# Patient Record
Sex: FEMALE | Race: WHITE | NOT HISPANIC OR LATINO | URBAN - NONMETROPOLITAN AREA
[De-identification: names, ages, dates, MRNs, and addresses within clinical notes are randomized per-mention and may not be internally consistent; named-entity substitution may affect disease eponyms.]

---

## 2024-05-14 ENCOUNTER — APPOINTMENT (RX ONLY)
Dept: URBAN - NONMETROPOLITAN AREA CLINIC 1 | Facility: CLINIC | Age: 34
Setting detail: DERMATOLOGY
End: 2024-05-14

## 2024-05-14 DIAGNOSIS — D22 MELANOCYTIC NEVI: ICD-10-CM

## 2024-05-14 DIAGNOSIS — L91.8 OTHER HYPERTROPHIC DISORDERS OF THE SKIN: ICD-10-CM

## 2024-05-14 PROBLEM — D22.5 MELANOCYTIC NEVI OF TRUNK: Status: ACTIVE | Noted: 2024-05-14

## 2024-05-14 PROCEDURE — ? OBSERVATION

## 2024-05-14 PROCEDURE — ? COUNSELING

## 2024-05-14 PROCEDURE — 99202 OFFICE O/P NEW SF 15 MIN: CPT

## 2024-05-14 PROCEDURE — ? SKIN TAG REMOVAL (COSMETIC)

## 2024-05-14 ASSESSMENT — LOCATION DETAILED DESCRIPTION DERM
LOCATION DETAILED: PERIUMBILICAL SKIN
LOCATION DETAILED: RIGHT CLAVICULAR NECK
LOCATION DETAILED: LEFT ANTERIOR MEDIAL PROXIMAL THIGH
LOCATION DETAILED: RIGHT INFERIOR LATERAL NECK
LOCATION DETAILED: RIGHT INFRAMAMMARY CREASE (INNER QUADRANT)
LOCATION DETAILED: LEFT AXILLARY VAULT
LOCATION DETAILED: RIGHT LATERAL UPPER BACK
LOCATION DETAILED: LEFT SUPERIOR ANTERIOR NECK
LOCATION DETAILED: EPIGASTRIC SKIN

## 2024-05-14 ASSESSMENT — LOCATION ZONE DERM
LOCATION ZONE: AXILLAE
LOCATION ZONE: TRUNK
LOCATION ZONE: NECK
LOCATION ZONE: LEG

## 2024-05-14 ASSESSMENT — LOCATION SIMPLE DESCRIPTION DERM
LOCATION SIMPLE: RIGHT UPPER BACK
LOCATION SIMPLE: LEFT AXILLARY VAULT
LOCATION SIMPLE: ABDOMEN
LOCATION SIMPLE: RIGHT ANTERIOR NECK
LOCATION SIMPLE: RIGHT BREAST
LOCATION SIMPLE: LEFT ANTERIOR NECK
LOCATION SIMPLE: LEFT THIGH

## 2024-05-14 NOTE — PROCEDURE: SKIN TAG REMOVAL (COSMETIC)
Anesthesia Type: 1% lidocaine with epinephrine and a 1:10 solution of 8.4% sodium bicarbonate
Anesthesia Volume In Cc: 0.5
Detail Level: Detailed
Hemostasis: Aluminum Chloride
Removed With: scissors
Consent: Written consent obtained and the risks of skin tag removal was reviewed with the patient including but not limited to bleeding, pigmentary change, infection, pain, and remote possibility of scarring.
Price (Use Numbers Only, No Special Characters Or $): 160

## 2024-12-08 ENCOUNTER — HOSPITAL ENCOUNTER (INPATIENT)
Facility: MEDICAL CENTER | Age: 34
LOS: 3 days | DRG: 833 | End: 2024-12-12
Attending: STUDENT IN AN ORGANIZED HEALTH CARE EDUCATION/TRAINING PROGRAM | Admitting: STUDENT IN AN ORGANIZED HEALTH CARE EDUCATION/TRAINING PROGRAM
Payer: COMMERCIAL

## 2024-12-08 DIAGNOSIS — O36.5990 FETAL GROWTH RESTRICTION ANTEPARTUM: ICD-10-CM

## 2024-12-08 LAB
ABO + RH BLD: NORMAL
ABO GROUP BLD: NORMAL
ALBUMIN SERPL BCP-MCNC: 3.2 G/DL (ref 3.2–4.9)
ALBUMIN/GLOB SERPL: 1 G/DL
ALP SERPL-CCNC: 141 U/L (ref 30–99)
ALT SERPL-CCNC: 13 U/L (ref 2–50)
ANION GAP SERPL CALC-SCNC: 11 MMOL/L (ref 7–16)
AST SERPL-CCNC: 29 U/L (ref 12–45)
BILIRUB SERPL-MCNC: <0.2 MG/DL (ref 0.1–1.5)
BLD GP AB SCN SERPL QL: NORMAL
BUN SERPL-MCNC: 11 MG/DL (ref 8–22)
CALCIUM ALBUM COR SERPL-MCNC: 9.1 MG/DL (ref 8.5–10.5)
CALCIUM SERPL-MCNC: 8.5 MG/DL (ref 8.5–10.5)
CHLORIDE SERPL-SCNC: 107 MMOL/L (ref 96–112)
CO2 SERPL-SCNC: 18 MMOL/L (ref 20–33)
CREAT SERPL-MCNC: 0.81 MG/DL (ref 0.5–1.4)
ERYTHROCYTE [DISTWIDTH] IN BLOOD BY AUTOMATED COUNT: 43.8 FL (ref 35.9–50)
GFR SERPLBLD CREATININE-BSD FMLA CKD-EPI: 97 ML/MIN/1.73 M 2
GLOBULIN SER CALC-MCNC: 3.2 G/DL (ref 1.9–3.5)
GLUCOSE SERPL-MCNC: 96 MG/DL (ref 65–99)
HCT VFR BLD AUTO: 38 % (ref 37–47)
HGB BLD-MCNC: 13.2 G/DL (ref 12–16)
MAGNESIUM SERPL-MCNC: 4.3 MG/DL (ref 1.5–2.5)
MAGNESIUM SERPL-MCNC: 4.8 MG/DL (ref 1.5–2.5)
MCH RBC QN AUTO: 32 PG (ref 27–33)
MCHC RBC AUTO-ENTMCNC: 34.7 G/DL (ref 32.2–35.5)
MCV RBC AUTO: 92.2 FL (ref 81.4–97.8)
PLATELET # BLD AUTO: 265 K/UL (ref 164–446)
PMV BLD AUTO: 9.9 FL (ref 9–12.9)
POTASSIUM SERPL-SCNC: 4.1 MMOL/L (ref 3.6–5.5)
PROT SERPL-MCNC: 6.4 G/DL (ref 6–8.2)
RBC # BLD AUTO: 4.12 M/UL (ref 4.2–5.4)
RH BLD: NORMAL
SODIUM SERPL-SCNC: 136 MMOL/L (ref 135–145)
T PALLIDUM AB SER QL IA: NONREACTIVE
WBC # BLD AUTO: 9.2 K/UL (ref 4.8–10.8)

## 2024-12-08 PROCEDURE — 96365 THER/PROPH/DIAG IV INF INIT: CPT

## 2024-12-08 PROCEDURE — 302790 HCHG STAT ANTEPARTUM CARE, DAILY

## 2024-12-08 PROCEDURE — 86850 RBC ANTIBODY SCREEN: CPT

## 2024-12-08 PROCEDURE — 86901 BLOOD TYPING SEROLOGIC RH(D): CPT

## 2024-12-08 PROCEDURE — 96366 THER/PROPH/DIAG IV INF ADDON: CPT

## 2024-12-08 PROCEDURE — 36415 COLL VENOUS BLD VENIPUNCTURE: CPT

## 2024-12-08 PROCEDURE — 80053 COMPREHEN METABOLIC PANEL: CPT

## 2024-12-08 PROCEDURE — A9270 NON-COVERED ITEM OR SERVICE: HCPCS | Performed by: STUDENT IN AN ORGANIZED HEALTH CARE EDUCATION/TRAINING PROGRAM

## 2024-12-08 PROCEDURE — 86780 TREPONEMA PALLIDUM: CPT

## 2024-12-08 PROCEDURE — 700111 HCHG RX REV CODE 636 W/ 250 OVERRIDE (IP)

## 2024-12-08 PROCEDURE — 85027 COMPLETE CBC AUTOMATED: CPT

## 2024-12-08 PROCEDURE — 99222 1ST HOSP IP/OBS MODERATE 55: CPT | Performed by: STUDENT IN AN ORGANIZED HEALTH CARE EDUCATION/TRAINING PROGRAM

## 2024-12-08 PROCEDURE — 86900 BLOOD TYPING SEROLOGIC ABO: CPT

## 2024-12-08 PROCEDURE — 700102 HCHG RX REV CODE 250 W/ 637 OVERRIDE(OP): Performed by: STUDENT IN AN ORGANIZED HEALTH CARE EDUCATION/TRAINING PROGRAM

## 2024-12-08 PROCEDURE — 83735 ASSAY OF MAGNESIUM: CPT

## 2024-12-08 RX ORDER — SODIUM CHLORIDE, SODIUM LACTATE, POTASSIUM CHLORIDE, CALCIUM CHLORIDE 600; 310; 30; 20 MG/100ML; MG/100ML; MG/100ML; MG/100ML
INJECTION, SOLUTION INTRAVENOUS CONTINUOUS
Status: DISCONTINUED | OUTPATIENT
Start: 2024-12-08 | End: 2024-12-11

## 2024-12-08 RX ORDER — BETAMETHASONE SODIUM PHOSPHATE AND BETAMETHASONE ACETATE 3; 3 MG/ML; MG/ML
12 INJECTION, SUSPENSION INTRA-ARTICULAR; INTRALESIONAL; INTRAMUSCULAR; SOFT TISSUE ONCE
Status: DISCONTINUED | OUTPATIENT
Start: 2024-12-09 | End: 2024-12-08

## 2024-12-08 RX ORDER — CALCIUM GLUCONATE 94 MG/ML
1 INJECTION, SOLUTION INTRAVENOUS
Status: DISCONTINUED | OUTPATIENT
Start: 2024-12-08 | End: 2024-12-09

## 2024-12-08 RX ORDER — BETAMETHASONE SODIUM PHOSPHATE AND BETAMETHASONE ACETATE 3; 3 MG/ML; MG/ML
12 INJECTION, SUSPENSION INTRA-ARTICULAR; INTRALESIONAL; INTRAMUSCULAR; SOFT TISSUE ONCE
Status: COMPLETED | OUTPATIENT
Start: 2024-12-09 | End: 2024-12-09

## 2024-12-08 RX ORDER — MAGNESIUM SULFATE HEPTAHYDRATE 40 MG/ML
4 INJECTION, SOLUTION INTRAVENOUS ONCE
Status: DISCONTINUED | OUTPATIENT
Start: 2024-12-08 | End: 2024-12-09

## 2024-12-08 RX ORDER — MAGNESIUM SULFATE HEPTAHYDRATE 40 MG/ML
2 INJECTION, SOLUTION INTRAVENOUS CONTINUOUS
Status: DISCONTINUED | OUTPATIENT
Start: 2024-12-08 | End: 2024-12-09

## 2024-12-08 RX ORDER — ECHINACEA PURPUREA EXTRACT 125 MG
2 TABLET ORAL
Status: DISCONTINUED | OUTPATIENT
Start: 2024-12-08 | End: 2024-12-12 | Stop reason: HOSPADM

## 2024-12-08 RX ORDER — MAGNESIUM SULFATE HEPTAHYDRATE 40 MG/ML
INJECTION, SOLUTION INTRAVENOUS
Status: COMPLETED
Start: 2024-12-08 | End: 2024-12-08

## 2024-12-08 RX ORDER — MAGNESIUM SULFATE HEPTAHYDRATE 40 MG/ML
2 INJECTION, SOLUTION INTRAVENOUS CONTINUOUS
Status: DISCONTINUED | OUTPATIENT
Start: 2024-12-08 | End: 2024-12-12 | Stop reason: HOSPADM

## 2024-12-08 RX ORDER — SODIUM CHLORIDE, SODIUM LACTATE, POTASSIUM CHLORIDE, CALCIUM CHLORIDE 600; 310; 30; 20 MG/100ML; MG/100ML; MG/100ML; MG/100ML
INJECTION, SOLUTION INTRAVENOUS CONTINUOUS
Status: DISCONTINUED | OUTPATIENT
Start: 2024-12-08 | End: 2024-12-08

## 2024-12-08 RX ORDER — ACETAMINOPHEN 500 MG
1000 TABLET ORAL EVERY 6 HOURS PRN
Status: DISCONTINUED | OUTPATIENT
Start: 2024-12-08 | End: 2024-12-12 | Stop reason: HOSPADM

## 2024-12-08 RX ORDER — LABETALOL HYDROCHLORIDE 5 MG/ML
20-80 INJECTION, SOLUTION INTRAVENOUS
Status: DISCONTINUED | OUTPATIENT
Start: 2024-12-08 | End: 2024-12-12 | Stop reason: HOSPADM

## 2024-12-08 RX ORDER — NIFEDIPINE 10 MG/1
10 CAPSULE ORAL
Status: DISCONTINUED | OUTPATIENT
Start: 2024-12-08 | End: 2024-12-12 | Stop reason: HOSPADM

## 2024-12-08 RX ORDER — HYDRALAZINE HYDROCHLORIDE 20 MG/ML
5-10 INJECTION INTRAMUSCULAR; INTRAVENOUS
Status: DISCONTINUED | OUTPATIENT
Start: 2024-12-08 | End: 2024-12-12 | Stop reason: HOSPADM

## 2024-12-08 RX ADMIN — MAGNESIUM SULFATE HEPTAHYDRATE 40 G: 40 INJECTION, SOLUTION INTRAVENOUS at 16:42

## 2024-12-08 RX ADMIN — MAGNESIUM SULFATE IN WATER 40 G: 40 INJECTION, SOLUTION INTRAVENOUS at 16:42

## 2024-12-08 RX ADMIN — Medication 2 SPRAY: at 18:17

## 2024-12-08 RX ADMIN — ACETAMINOPHEN 1000 MG: 500 TABLET, FILM COATED ORAL at 21:35

## 2024-12-08 SDOH — ECONOMIC STABILITY: TRANSPORTATION INSECURITY
IN THE PAST 12 MONTHS, HAS THE LACK OF TRANSPORTATION KEPT YOU FROM MEDICAL APPOINTMENTS OR FROM GETTING MEDICATIONS?: NO

## 2024-12-08 SDOH — ECONOMIC STABILITY: TRANSPORTATION INSECURITY
IN THE PAST 12 MONTHS, HAS LACK OF RELIABLE TRANSPORTATION KEPT YOU FROM MEDICAL APPOINTMENTS, MEETINGS, WORK OR FROM GETTING THINGS NEEDED FOR DAILY LIVING?: NO

## 2024-12-08 ASSESSMENT — SOCIAL DETERMINANTS OF HEALTH (SDOH)
WITHIN THE LAST YEAR, HAVE YOU BEEN AFRAID OF YOUR PARTNER OR EX-PARTNER?: NO
IN THE PAST 12 MONTHS, HAS THE ELECTRIC, GAS, OIL, OR WATER COMPANY THREATENED TO SHUT OFF SERVICE IN YOUR HOME?: NO
WITHIN THE LAST YEAR, HAVE YOU BEEN HUMILIATED OR EMOTIONALLY ABUSED IN OTHER WAYS BY YOUR PARTNER OR EX-PARTNER?: NO
WITHIN THE PAST 12 MONTHS, THE FOOD YOU BOUGHT JUST DIDN'T LAST AND YOU DIDN'T HAVE MONEY TO GET MORE: NEVER TRUE
WITHIN THE PAST 12 MONTHS, YOU WORRIED THAT YOUR FOOD WOULD RUN OUT BEFORE YOU GOT THE MONEY TO BUY MORE: NEVER TRUE
WITHIN THE LAST YEAR, HAVE YOU BEEN KICKED, HIT, SLAPPED, OR OTHERWISE PHYSICALLY HURT BY YOUR PARTNER OR EX-PARTNER?: NO
WITHIN THE LAST YEAR, HAVE TO BEEN RAPED OR FORCED TO HAVE ANY KIND OF SEXUAL ACTIVITY BY YOUR PARTNER OR EX-PARTNER?: NO

## 2024-12-08 ASSESSMENT — PATIENT HEALTH QUESTIONNAIRE - PHQ9
2. FEELING DOWN, DEPRESSED, IRRITABLE, OR HOPELESS: NOT AT ALL
2. FEELING DOWN, DEPRESSED, IRRITABLE, OR HOPELESS: NOT AT ALL
1. LITTLE INTEREST OR PLEASURE IN DOING THINGS: NOT AT ALL
SUM OF ALL RESPONSES TO PHQ9 QUESTIONS 1 AND 2: 0
SUM OF ALL RESPONSES TO PHQ9 QUESTIONS 1 AND 2: 0
1. LITTLE INTEREST OR PLEASURE IN DOING THINGS: NOT AT ALL

## 2024-12-08 ASSESSMENT — LIFESTYLE VARIABLES: ALCOHOL_USE: NO

## 2024-12-09 LAB
MAGNESIUM SERPL-MCNC: 5.5 MG/DL (ref 1.5–2.5)
MAGNESIUM SERPL-MCNC: 6.3 MG/DL (ref 1.5–2.5)

## 2024-12-09 PROCEDURE — 99232 SBSQ HOSP IP/OBS MODERATE 35: CPT | Performed by: OBSTETRICS & GYNECOLOGY

## 2024-12-09 PROCEDURE — 59025 FETAL NON-STRESS TEST: CPT

## 2024-12-09 PROCEDURE — 87150 DNA/RNA AMPLIFIED PROBE: CPT

## 2024-12-09 PROCEDURE — 96372 THER/PROPH/DIAG INJ SC/IM: CPT

## 2024-12-09 PROCEDURE — 96366 THER/PROPH/DIAG IV INF ADDON: CPT

## 2024-12-09 PROCEDURE — 84156 ASSAY OF PROTEIN URINE: CPT

## 2024-12-09 PROCEDURE — 87081 CULTURE SCREEN ONLY: CPT

## 2024-12-09 PROCEDURE — A9270 NON-COVERED ITEM OR SERVICE: HCPCS | Performed by: STUDENT IN AN ORGANIZED HEALTH CARE EDUCATION/TRAINING PROGRAM

## 2024-12-09 PROCEDURE — 76811 OB US DETAILED SNGL FETUS: CPT | Mod: 26 | Performed by: OBSTETRICS & GYNECOLOGY

## 2024-12-09 PROCEDURE — 83735 ASSAY OF MAGNESIUM: CPT

## 2024-12-09 PROCEDURE — 700105 HCHG RX REV CODE 258: Performed by: STUDENT IN AN ORGANIZED HEALTH CARE EDUCATION/TRAINING PROGRAM

## 2024-12-09 PROCEDURE — 81050 URINALYSIS VOLUME MEASURE: CPT

## 2024-12-09 PROCEDURE — 36415 COLL VENOUS BLD VENIPUNCTURE: CPT

## 2024-12-09 PROCEDURE — 302790 HCHG STAT ANTEPARTUM CARE, DAILY

## 2024-12-09 PROCEDURE — 700111 HCHG RX REV CODE 636 W/ 250 OVERRIDE (IP): Performed by: STUDENT IN AN ORGANIZED HEALTH CARE EDUCATION/TRAINING PROGRAM

## 2024-12-09 PROCEDURE — 700102 HCHG RX REV CODE 250 W/ 637 OVERRIDE(OP): Performed by: STUDENT IN AN ORGANIZED HEALTH CARE EDUCATION/TRAINING PROGRAM

## 2024-12-09 PROCEDURE — 770002 HCHG ROOM/CARE - OB PRIVATE (112)

## 2024-12-09 RX ORDER — VITAMIN A ACETATE, BETA CAROTENE, ASCORBIC ACID, CHOLECALCIFEROL, .ALPHA.-TOCOPHEROL ACETATE, DL-, THIAMINE MONONITRATE, RIBOFLAVIN, NIACINAMIDE, PYRIDOXINE HYDROCHLORIDE, FOLIC ACID, CYANOCOBALAMIN, CALCIUM CARBONATE, FERROUS FUMARATE, ZINC OXIDE, CUPRIC OXIDE 3080; 12; 120; 400; 1; 1.84; 3; 20; 22; 920; 25; 200; 27; 10; 2 [IU]/1; UG/1; MG/1; [IU]/1; MG/1; MG/1; MG/1; MG/1; MG/1; [IU]/1; MG/1; MG/1; MG/1; MG/1; MG/1
1 TABLET, FILM COATED ORAL
Status: DISCONTINUED | OUTPATIENT
Start: 2024-12-09 | End: 2024-12-12 | Stop reason: HOSPADM

## 2024-12-09 RX ADMIN — NIFEDIPINE 10 MG: 10 CAPSULE ORAL at 14:40

## 2024-12-09 RX ADMIN — PRENATAL WITH FERROUS FUM AND FOLIC ACID 1 TABLET: 3080; 920; 120; 400; 22; 1.84; 3; 20; 10; 1; 12; 200; 27; 25; 2 TABLET ORAL at 08:51

## 2024-12-09 RX ADMIN — SODIUM CHLORIDE, POTASSIUM CHLORIDE, SODIUM LACTATE AND CALCIUM CHLORIDE: 600; 310; 30; 20 INJECTION, SOLUTION INTRAVENOUS at 10:58

## 2024-12-09 RX ADMIN — BETAMETHASONE SODIUM PHOSPHATE AND BETAMETHASONE ACETATE 12 MG: 3; 3 INJECTION, SUSPENSION INTRA-ARTICULAR; INTRALESIONAL; INTRAMUSCULAR at 14:25

## 2024-12-09 NOTE — CONSULTS
DATE OF SERVICE:  2024     REASON FOR CONSULTATION:  Evaluation of preeclampsia.     REQUESTING PHYSICIAN:  Dr. Akhtar.     REFERRING PHYSICIAN: Dr. Ashley.     REFERRING INSTITUTION:  [facility].     HISTORY OF PRESENT ILLNESS:  This is a 34-year-old , EDC 2025,   currently at 32 weeks and 2 days.  The patient presented today to the labor   and delivery with complaints of cold symptoms for the past 2 days who was   experiencing sinus pressure and thought her face was becoming swollen.  She   has noticed some lower extremity and upper extremity edema and also, she   noticed some blurred vision.  Upon evaluation, she had significant   hypertension requiring IV labetalol, received 2 doses 20 and 40 mg   respectively.  Magnesium sulfate was initiated, first dose betamethasone   administered and transfer was requested and approved.  The patient arrived   safely on magnesium sulfate 2 grams per hour.     PAST MEDICAL HISTORY:  She denies any prior major medical problems including   asthma, seizures, hypertension, cardiovascular, GI, or  diseases.     PREVIOUS OPERATIONS:   section and LEEP, transfusion in  when she   had episode of sepsis and postpartum hemorrhage.     VENEREAL DISEASE HISTORY:  HPV positive, negative for other STIs.     HABITS:  None.     CURRENT MEDICATIONS:  None.     PHYSICAL EXAMINATION:  GENERAL:  Well-developed, well-nourished female, alert and oriented x3, in no   acute distress.  VITAL SIGNS:  Initial blood pressure on arrival 135/82.  Highest since   transfer was 152/87.  Pulse ox is at 93% to 95%.  HEAD:  Normocephalic.  EYES:  No scleral icterus or subconjunctival pallor.  Pupils equal, react to   light and accommodation.  Extraocular movements symmetrical.  Ear, nose and   throat exam grossly within normal limits.  LUNGS:  Clear.  HEART:  Regular rate and rhythm, normal S1 and S2.  No S3, S4 or murmurs.  ABDOMEN:  Soft, gravid uterus.  EXTREMITIES:  1-2+  pitting edema, reflex 3+, no clonus.  The patient is on   magnesium sulfate.  NEUROLOGIC:  Cranial nerves II-XII grossly intact.     LABORATORY DATA:  Hemoglobin 13.2, hematocrit 38.0, platelet count 265,000.    Chemistries are normal with BUN of 11, creatinine mildly elevated at 0.81.    Hepatitis B and C studies were negative previously.  HIV negative, RPR today   is negative.  She is O positive.  She and her provider reports a low risk   NIPT.     ASSESSMENT:  1.  Intrauterine pregnancy 33 weeks and 2 days.  2.  Gestational hypertension versus preeclampsia.  3.  Previous  section.  4.  History of sepsis.  5.  History of postpartum hemorrhage.     PLAN:  Complete corticosteroids 48 hour window and maintain magnesium sulfate   for that duration.  After completion of the steroid window, discontinue   magnesium sulfate and reassess the patient. Evidence of severe disease would   be an indication for delivery or nonreassuring fetal status.     Fetal heart rate monitoring reveals baseline at 120 beats per minute, moderate   variability, accelerations are present.  Irregular contractions are present,   no decelerations.        ______________________________  MD DEENA BURLESON/DOREEN    DD:  2024 20:03  DT:  2024 23:55    Job#:  [id number]

## 2024-12-09 NOTE — CARE PLAN
The patient is Watcher - Medium risk of patient condition declining or worsening    Shift Goals  Clinical Goals: stable BP, reasurring fetal/maternal status  Patient Goals: stay pregnant, recieve updates regarding status of self and fetus  Family Goals: support, recieve updates on status of wife and fetus      Problem: Psychosocial - L&D  Goal: Patient's level of anxiety will decrease  Outcome: Progressing  Patient encouraged to ask questions and state needs. Denies questions/concerns regarding care since arrival to [facility] or POC at this point     Problem: Risk for Injury  Goal: Patient and fetus will be free of preventable injury/complications  Outcome: Progressing   Safety measures in place for fetal/maternal well being  - continuous EFM, BP/SP02/sequentials.

## 2024-12-09 NOTE — H&P
"Obstetrics Admission History and Physical      History of Present Illness  Patient is a 34 y.o.  at  33+2weeks by LMP here as a transfer from [facility] for severe-range BPs GBS pending. BMTZ at 1416. She reciceved a 4gm bolus and it has been continued on 2gm/hr. She had an URI and had a mild headache with blurry vision on presentation that she thought was releated to her URI. Viral swabs were negative. Headache now resolved once she got here however she still has some blurry vision. She denies any history of cHTN or HTN in her last pregnancy.     Records reviewed from prior to transfer:  Notified by RN repeat BP now 171/79  Has received 2 doses iv labetalol  Will give po nifedipine 10 mg now  Awaiting transport team  Electronically signed by Sandy Kaplan DO at 2024 4:34 PM CST   2024 - 33w2d - Johanna Driver RN  Formatting of this note might be different from the original.  Pt here, woke up with blurred vision today, hx ocular migraines, but doesn't feel that that is this.she states she has been fighting off an upper respiratory virus for last 2 weeks, took tylenol shreyas 8 or 9 am, then called dr kaplan about noon. Woke up with headache about 0800.states has some blurred vision as well. States headache pain is a 2 now, \"dull\".  1110-lab here, drawn.  States has been tested for covid, strep, flu, all neg.       Pregnancy dated by:   Last Menstrual Period on 2024 Working   Ultrasound on 2024 -1d   GA: 8w5d   Comment: Viable IUP w/ CRL 2.07cm; +cardiac activity   Ultrasound on 2024 -2d   GA: 20w1d   Comment: Normal anatomy; CHIRAG=12.47cm; cervical length 6.92cm, posterior placenta     Pregnancy Summary - Prenatal Vitals  Prenatal Vitals  Pregravid Weight Height TWG (As of 2024) Pregravid BMI     63.5 kg (140 lb) 5' 3\" (1.6 m) 18.6 kg (41 lb) 24.81        Pregnancy complications/antepartum admissions:   Overview (2024):  "   Formatting of this note might be different from the original.  Problems:  # previous c/s: repeat 39w  # AMA  # history of LEEP  Third-stage postpartum hemorrhage 2023    delivery, delivered, current hospitalization 2023   Sepsis with acute renal failure without septic shock 2023   Anemia complicating pregnancy - iron          FOB: Attila  Occupation: CPA  Sex: girl!    Aneuploidy screening: low risk  Carrier testing: negative x 14  AFP: negative    GBS:  Tdap:  Flu vaccine:2024  COVID19 vaccine:,2024      2023 Term 40w6d 7h 00m 2h 25m/4h 35m/ 3410 g (7 lb 8.3 oz) M CS-LTranv Epidural/Spinal   Living  MARBELLA KINSEY1[place],        Copper allergy. LEEP I  with normal paps on follow up      Allergies   Allergen Reactions    Copper      Increased vaginal mucous with copper IUD         LEEP and c/s    Denies TADs. Is safe at home without any history of abuse.      Review of Systems   General: Fever: Negative  HEENT: Sore Throat: Negative  CV: Chest Pain: Negative  Repiratory: Shortness of Breath: Negative  GI: Abdominal pain: Negative  : Dysuria: Negative    Physical Examination  Vitals:    24 1705   BP:    Pulse: 83   SpO2: 95%   BP here: 137/84    Appearance/Psychiatric: She does not appear anxious.  Constitutional: The patient is well nourished.  Neck: Neck appears symmetric.  Cardiovascular: No peripheral edema.  Respiratory: Respirations unlabored  Gastrointestinal: Soft, non-tender, gravid.  Extremeties: Legs are symmetric and without tenderness.   Skin: No rash observed.    Pelvic: SVE: deferred  GBS re-collected here  NST: 130, mod fabián, pos accels, no decels, no CTX on admission however possible uterine irritability noted. Reactive  Estimated fetal weight: 2600gm    Labs:  Recent Labs     24  0828 10/23/24  0923 24  1311   HEMOGLOBIN 13.1   < > 12.6   PLATELETCT 332   < > 226    HEPBSAG NON-REACTIVE  --   --     < > = values in this interval not displayed.     GBS pending    Assessment/Plan:    at 33+2weeks here trasnferred here to [facility] service for severe-range BP on magnesium s/p 1st dose of BMTZ. BP has been normal to mild-range since being here.     Reviewed plan of care with patient and . Revoewed that for now, anything can happen all the way from delivery tonight to improving and possibly delivering at term though that is more unlikely. I reviewed that the plan can change at any time and is guided by her clinical course while inpatient. MFM will be consulted regarding any immediate concerns. Discussed that the plan or goal for now is to get to steroid complete and likely delivery at 34weeks but again this can change.      -admit to LND, [facility] service if not delivered by the morning  -CEFM  -magnesium 2gm/hr  -2nd dose BMTZ due tomorrow at 1415 ()  -cbc, cmp, mag level now, 24hr urine protein and q 6hr mag levels  -will need NICU consult if delivery imminent  -Per plan from MFM, if s/sx of worsening severe pre-e, plan for delivery   -plan for RCS and no tubal if needing delivery  -considering a post-placental IUD vs postpartum IUD  -all questions answered and pt agreeable to plan of care      Tonia Santos DO, FACOG  [facility]

## 2024-12-09 NOTE — PROGRESS NOTES
1900- Received report from ASHLYN Isabel.     2117- Received orders for tylenol, refer to MAR.     2355- Phone call to Dr. Lin with mag level result. Received orders to titrate magnesium drip, refer to MAR.     0700- Report given to ASHLYN Pepper.

## 2024-12-09 NOTE — PROGRESS NOTES
0700 Report received from Meka BUCHANAN RN at bedside and assumed care. POC discussed with pt and encouraged to state needs or questions at any time.    0718 Patient sitting up eating breakfast, will call out when done to adjust fetal monitors    0825 Sonographer at bedside    1515 Dr. Platt updated, orders received to D/C magnesium (see orders).    1900 Report given to Angeles APPIAH RN at bedside, care relinquished.

## 2024-12-09 NOTE — PROGRESS NOTES
1621 - Patient transport from [facility] presents with elevated BP for further observation and treatment.   Mendez Gestation today at 33.2 weeks    Patient reports PNC in [facility], denies problems with the pregnancy to this point.     Patient reports an upper respiratory cold and sinus issue for the past 2 weeks. Today she noted an increase in facial swelling. Once at the hospital in [facility], the medical team informed her of severly elevated BP. Dr. MIRIAN Lin was consulted and recommended transfer.     Medication prior to transfer include:    IV labetalol x2 at 1347 and 1410  Nifedipine at 1441  Magnesium sulfate >4g bolus at 1441, currently 2g maintenance   Betamethasone at 1416  Group B strep collected    On arrival to [facility] patient reports she had a HA of 2/10 this am and is now resolved to 0. Patient reports her vision was blurry this am as well, reports it is a little better but remains blurry.     Denies contractions/cramping, denies ROM or bleeding. Denies change to vision/edema/HA. Reports FM. FM/TOCO use discussed and placed, POC discussed. Discussed potential for carrasco catheter placement - plan to assess stability with assist to the BR/BSC.  Patient instructed not to eat or drink until assessment with the physician.     ERIC Aiken is at the BS engaged and offering support.     Dry erase board updated with RN contact information; reviewed.   Patient encouraged to call RN with all questions concerns needs prn.    1650 - Report to Dr. Santos regarding patient arrival/complaint/status. Orders received.     1700 - Dr. Santos at the BS assessing complaint and discussing the POC.     1835 - Dr. Santos at the BS discussing lab results and POC, including an order for a regular diet - approved one tray at a time. Diet trays requested to be left at the nursing station.   [place] ordered now.     1900 - BS report to Karen GOLDBERG

## 2024-12-09 NOTE — CARE PLAN
The patient is Stable - Low risk of patient condition declining or worsening    Shift Goals  Clinical Goals: Monitor VS.  Patient Goals: Rest and comfort.  Family Goals: support, recieve updates on status of wife and fetus    Progress made toward(s) clinical / shift goals:    Problem: Knowledge Deficit - L&D  Goal: Patient and family/caregivers will demonstrate understanding of plan of care, disease process/condition, diagnostic tests and medications  Outcome: Progressing     Problem: Risk for Excess Fluid Volume  Goal: Patient will demonstrate pulse, blood pressure and neurologic signs within expected ranges and without any respiratory complications  Outcome: Progressing     Problem: Psychosocial - L&D  Goal: Patient's level of anxiety will decrease  Outcome: Progressing  Goal: Patient will be able to discuss coping skills during hospitalization  Outcome: Progressing  Goal: Patient's ability to re-evaluate and adapt role responsibilities will improve  Outcome: Progressing  Goal: Spiritual and cultural needs incorporated into hospitalization  Outcome: Progressing     Problem: Cardiac Output  Goal: Patient will remain normotensive throughout hospitalization  Outcome: Progressing     Problem: Pain  Goal: Patient's pain will be alleviated or reduced to the patient’s comfort goal  Outcome: Progressing     Problem: Risk for Fluid Imbalance  Goal: Patient's fluid volume balance will be maintained or improve  Outcome: Progressing     Problem: Risk for Infection and Impaired Wound Healing  Goal: Patient will remain free from infection  Outcome: Progressing  Note: Pt is afebrile and free from s/s of infection at this time. Will continue to assess.  Goal: Patient's wound/surgical incision will decrease in size and heals properly  Outcome: Progressing     Problem: Risk for Injury  Goal: Patient and fetus will be free of preventable injury/complications  Outcome: Progressing  Note: Fetal monitoring performed as ordered and  encouraged pt to call out with any needs, questions, or concerns as soon as they arise. Will continue to assess maternal and fetal status.     Problem: Risk for Venous Thromboembolism (VTE)  Goal: VTE prevention measures will be implemented and patient will remain free from VTE  Outcome: Progressing     Problem: Discharge Barriers/Planning  Goal: Patient's continuum of care needs are met  Outcome: Progressing       Patient is not progressing towards the following goals:

## 2024-12-09 NOTE — PROGRESS NOTES
"ANTEPARTUM PROGRESS NOTE;    Sasha Bryant is a 34 y.o. female  at 33w3d.  Admitted with elevated BP's r/o preeclampsia.   She denies any HA, epigastric pain    Review of systems; denies vaginal bleeding, leakage of fluid, uterine contractions, fever chills or abdominal pain    Physical examination;  Alert and oriented x3  Gen.-well-developed well-nourished female in no apparent distress  HEENT-normocephalic, nontraumatic,EOMI,PERRLA  BP (!) 147/88   Pulse 89   Temp 36.7 °C (98 °F) (Temporal)   Resp 18   Ht 5' 3\"   Wt 181 lb   SpO2 95%   BMI 32.06 kg/m²   Abdomen-Soft, gravid, nontender  Extremities without cyanosis clubbing or edema  Neurologic grossly intact    Labs:  Admission on 2024   Component Date Value Ref Range Status    WBC 2024 9.2  4.8 - 10.8 K/uL Final    RBC 2024 4.12 (L)  4.20 - 5.40 M/uL Final    Hemoglobin 2024 13.2  12.0 - 16.0 g/dL Final    Hematocrit 2024 38.0  37.0 - 47.0 % Final    MCV 2024 92.2  81.4 - 97.8 fL Final    MCH 2024 32.0  27.0 - 33.0 pg Final    MCHC 2024 34.7  32.2 - 35.5 g/dL Final    RDW 2024 43.8  35.9 - 50.0 fL Final    Platelet Count 2024 265  164 - 446 K/uL Final    MPV 2024 9.9  9.0 - 12.9 fL Final    Sodium 2024 136  135 - 145 mmol/L Final    Potassium 2024 4.1  3.6 - 5.5 mmol/L Final    Chloride 2024 107  96 - 112 mmol/L Final    Co2 2024 18 (L)  20 - 33 mmol/L Final    Anion Gap 2024 11.0  7.0 - 16.0 Final    Glucose 2024 96  65 - 99 mg/dL Final    Bun 2024 11  8 - 22 mg/dL Final    Creatinine 2024 0.81  0.50 - 1.40 mg/dL Final    Calcium 2024 8.5  8.5 - 10.5 mg/dL Final    Correct Calcium 2024 9.1  8.5 - 10.5 mg/dL Final    AST(SGOT) 2024 29  12 - 45 U/L Final    ALT(SGPT) 2024 13  2 - 50 U/L Final    Alkaline Phosphatase 2024 141 (H)  30 - 99 U/L Final    Total Bilirubin 2024 <0.2  0.1 - 1.5 mg/dL Final    " Albumin 12/08/2024 3.2  3.2 - 4.9 g/dL Final    Total Protein 12/08/2024 6.4  6.0 - 8.2 g/dL Final    Globulin 12/08/2024 3.2  1.9 - 3.5 g/dL Final    A-G Ratio 12/08/2024 1.0  g/dL Final    Magnesium 12/08/2024 4.3 (H)  1.5 - 2.5 mg/dL Final    ABO Grouping Only 12/08/2024 O   Final    Rh Grouping Only 12/08/2024 POS   Final    Antibody Screen-Cod 12/08/2024 NEG   Final    GFR (CKD-EPI) 12/08/2024 97  >60 mL/min/1.73 m 2 Final    Comment: Estimated Glomerular Filtration Rate is calculated using  race neutral CKD-EPI 2021 equation per NKF-ASN recommendations.      ABO Rh Confirm 12/08/2024 O POS   Final    Syphilis, Treponemal Qual 12/08/2024 Nonreactive  Non-Reactive Final    Magnesium 12/08/2024 4.8 (H)  1.5 - 2.5 mg/dL Final    Magnesium 12/09/2024 5.5 (H)  1.5 - 2.5 mg/dL Final    Results obtained by dilution.    Magnesium 12/09/2024 6.3 (H)  1.5 - 2.5 mg/dL Final    Results obtained by dilution.       Scheduled Medications   Medication Dose Frequency    prenatal plus vitamin  1 Tablet Daily-0800          Impression;  IUP AT 33w3d  U/S today showed IUGR, normal fluid and dopplers    2.   Gestational Hypertension.  Patient stable. Receiving her second dose of steroids and on Magnesium    Plan;  Will d/c magnesium after 24 hrs and monitor closely      Gabe Platt M.D.

## 2024-12-10 PROBLEM — O13.3 GESTATIONAL HYPERTENSION, THIRD TRIMESTER: Status: ACTIVE | Noted: 2024-12-10

## 2024-12-10 PROBLEM — O36.5990 FETAL GROWTH RESTRICTION ANTEPARTUM: Status: ACTIVE | Noted: 2024-12-10

## 2024-12-10 LAB
GP B STREP DNA SPEC QL NAA+PROBE: NEGATIVE
PROT 24H UR-MCNC: 6762 MG/24 HR (ref 30–150)
PROT 24H UR-MRATE: 161 MG/DL (ref 0–15)
SPECIMEN VOL UR: ABNORMAL ML

## 2024-12-10 PROCEDURE — 99232 SBSQ HOSP IP/OBS MODERATE 35: CPT | Mod: FS | Performed by: OBSTETRICS & GYNECOLOGY

## 2024-12-10 PROCEDURE — 770002 HCHG ROOM/CARE - OB PRIVATE (112)

## 2024-12-10 PROCEDURE — 700102 HCHG RX REV CODE 250 W/ 637 OVERRIDE(OP): Performed by: STUDENT IN AN ORGANIZED HEALTH CARE EDUCATION/TRAINING PROGRAM

## 2024-12-10 PROCEDURE — A9270 NON-COVERED ITEM OR SERVICE: HCPCS | Performed by: STUDENT IN AN ORGANIZED HEALTH CARE EDUCATION/TRAINING PROGRAM

## 2024-12-10 PROCEDURE — 59025 FETAL NON-STRESS TEST: CPT

## 2024-12-10 PROCEDURE — 302790 HCHG STAT ANTEPARTUM CARE, DAILY

## 2024-12-10 RX ADMIN — PRENATAL WITH FERROUS FUM AND FOLIC ACID 1 TABLET: 3080; 920; 120; 400; 22; 1.84; 3; 20; 10; 1; 12; 200; 27; 25; 2 TABLET ORAL at 17:10

## 2024-12-10 ASSESSMENT — PATIENT HEALTH QUESTIONNAIRE - PHQ9
2. FEELING DOWN, DEPRESSED, IRRITABLE, OR HOPELESS: NOT AT ALL
SUM OF ALL RESPONSES TO PHQ9 QUESTIONS 1 AND 2: 0
1. LITTLE INTEREST OR PLEASURE IN DOING THINGS: NOT AT ALL

## 2024-12-10 ASSESSMENT — FIBROSIS 4 INDEX: FIB4 SCORE: 1.06

## 2024-12-10 ASSESSMENT — PAIN DESCRIPTION - PAIN TYPE: TYPE: ACUTE PAIN

## 2024-12-10 NOTE — PROGRESS NOTES
0700 - Report from ASHLYN Reynoso. Pt resting in bed, FOB at bedside. Pt states she is having slight nausea and stomach cramps, does not feel like UCs, feels more like upset stomach. Has had some diarrhea overnight. Denies HA, blurred vision, RUQ pain. EFM and toco in place, showing reactive NST and occasional UCs with uterine irritability. POC discussed, questions answered.   1600 - Pt reports feeling much better than this morning, feeling no UCs today, no headache or blurred vision.   1900 - Report to ASHLYN Ortez

## 2024-12-10 NOTE — CARE PLAN
The patient is Watcher - Medium risk of patient condition declining or worsening    Shift Goals  Clinical Goals: Monitor BP, promote rest  Patient Goals: rest  Family Goals: support    Problem: Knowledge Deficit - L&D  Goal: Patient and family/caregivers will demonstrate understanding of plan of care, disease process/condition, diagnostic tests and medications  Outcome: Progressing     POC discussed with pt, verbalizes understanding.       Problem: Pain  Goal: Patient's pain will be alleviated or reduced to the patient’s comfort goal  Outcome: Progressing    Denies pain at this time.

## 2024-12-10 NOTE — PROGRESS NOTES
1900: Received report from ASHLYN Pepper. POC discussed.    0700: Pt reports feeling more FM at this time. Report given to ASHLYN Ortez, introduced to pt.

## 2024-12-10 NOTE — CARE PLAN
The patient is Watcher - Medium risk of patient condition declining or worsening    Shift Goals  Clinical Goals: Monitor BP, promote rest  Patient Goals: rest  Family Goals: support    Problem: Knowledge Deficit - L&D  Goal: Patient and family/caregivers will demonstrate understanding of plan of care, disease process/condition, diagnostic tests and medications  Outcome: Progressing  Note: POC discussed, questions answered.      Problem: Pain  Goal: Patient's pain will be alleviated or reduced to the patient’s comfort goal  Flowsheets (Taken 12/10/2024 1013)  Pain Rating Scale (NPRS): 1  Note: Pt c/o stomach ache type pain, having some diarrhea through the night.

## 2024-12-10 NOTE — PROGRESS NOTES
"[facility] ANTEPARTUM PROGRESS NOTE;    Sasha Bryant is a 34 y.o. female  at 33w4d.    Patient Active Problem List    Diagnosis Date Noted    Gestational hypertension, third trimester 12/10/2024    Fetal growth restriction antepartum 12/10/2024    Indication for care in labor or delivery 2024         SUBJECTIVE:  Patient seen and examined. She is doing well. Reports some nausea today. Denies HA, change in vision, RUQ/epigastric pain, SOB, CP, fever, nausea/vomiting, edema or calf pain.     Contractions: irregular   Leaking of fluid: denies   Vaginal bleeding: denies  Fetal movement: present     Review of systems; denies fevers, chills, headaches, vision changes, dizziness, or abdominal pain      Physical examination;  BP (!) 144/80   Pulse 80   Temp 36.5 °C (97.7 °F) (Temporal)   Resp 18   Ht 1.6 m (5' 3\")   Wt 85.4 kg (188 lb 4.4 oz)   SpO2 95%   BMI 33.35 kg/m²   Appearance/Psychiatric: appears well and in NAD  Constitutional: The patient is well nourished.  Respiratory: Her respiratory effort is normal.  Gastrointestinal: Soft, gravid, non-tender  Extremities: no edema       Reference Range & Units 24 17:30   Total Protein, Urine 0.0 - 15.0 mg/dL 161.0 (H)   Total Volume, Urine mL 4200 mL   Total Protein, 24 Hour Urine 30.0 - 150.0 mg/24 Hr 6762.0 (H)       Assessment  IUP AT 33w4d   Ms. Bryant is a 34 y.o. year old female at 33w4d admitted on 24 with gestational HTN, r/o  pre-eclampsia, and FGR. Meets pre-e parameters by sever BP ranges and proteinuria. Completed BMZ x 2 and stopped magnesium yesterday. BP has been labile with some severe ranges. Last dose of Procardia IR 10mg given yesterday at 1440.   US  showed UA Dopplers and CHIRAG WNL.       Plan;  1. Continue observation   2. NST daily   3. Patient desires repeat c/s.   4. Close monitoring of BP, consult Dr Platt for any severe range pressures       Melanie Joe P.A.-C.  [facility] Service   Obstetrics " and Gynecology  12/10/2024     9:51 AM      Patient was seen in conjunction with Gabe Platt MD [facility]  who consulted and agrees with the plan.

## 2024-12-11 PROBLEM — Z86.19 HISTORY OF SEPSIS: Status: ACTIVE | Noted: 2024-12-11

## 2024-12-11 PROBLEM — Z87.59 HISTORY OF POSTPARTUM HEMORRHAGE: Status: ACTIVE | Noted: 2024-12-11

## 2024-12-11 PROBLEM — Z98.891 HISTORY OF CESAREAN DELIVERY: Status: ACTIVE | Noted: 2024-12-11

## 2024-12-11 PROBLEM — Z98.890 HISTORY OF LOOP ELECTRICAL EXCISION PROCEDURE (LEEP): Status: ACTIVE | Noted: 2024-12-11

## 2024-12-11 LAB
ALBUMIN SERPL BCP-MCNC: 3.1 G/DL (ref 3.2–4.9)
ALBUMIN/GLOB SERPL: 1 G/DL
ALP SERPL-CCNC: 138 U/L (ref 30–99)
ALT SERPL-CCNC: 17 U/L (ref 2–50)
ANION GAP SERPL CALC-SCNC: 12 MMOL/L (ref 7–16)
AST SERPL-CCNC: 24 U/L (ref 12–45)
BASOPHILS # BLD AUTO: 0.3 % (ref 0–1.8)
BASOPHILS # BLD: 0.03 K/UL (ref 0–0.12)
BILIRUB SERPL-MCNC: <0.2 MG/DL (ref 0.1–1.5)
BUN SERPL-MCNC: 16 MG/DL (ref 8–22)
CALCIUM ALBUM COR SERPL-MCNC: 9.1 MG/DL (ref 8.5–10.5)
CALCIUM SERPL-MCNC: 8.4 MG/DL (ref 8.5–10.5)
CHLORIDE SERPL-SCNC: 101 MMOL/L (ref 96–112)
CO2 SERPL-SCNC: 20 MMOL/L (ref 20–33)
CREAT SERPL-MCNC: 0.83 MG/DL (ref 0.5–1.4)
EOSINOPHIL # BLD AUTO: 0.09 K/UL (ref 0–0.51)
EOSINOPHIL NFR BLD: 0.8 % (ref 0–6.9)
ERYTHROCYTE [DISTWIDTH] IN BLOOD BY AUTOMATED COUNT: 46.5 FL (ref 35.9–50)
GFR SERPLBLD CREATININE-BSD FMLA CKD-EPI: 94 ML/MIN/1.73 M 2
GLOBULIN SER CALC-MCNC: 3.2 G/DL (ref 1.9–3.5)
GLUCOSE SERPL-MCNC: 87 MG/DL (ref 65–99)
HCT VFR BLD AUTO: 39.5 % (ref 37–47)
HGB BLD-MCNC: 13 G/DL (ref 12–16)
IMM GRANULOCYTES # BLD AUTO: 0.07 K/UL (ref 0–0.11)
IMM GRANULOCYTES NFR BLD AUTO: 0.6 % (ref 0–0.9)
LYMPHOCYTES # BLD AUTO: 2.14 K/UL (ref 1–4.8)
LYMPHOCYTES NFR BLD: 18.5 % (ref 22–41)
MCH RBC QN AUTO: 31.9 PG (ref 27–33)
MCHC RBC AUTO-ENTMCNC: 32.9 G/DL (ref 32.2–35.5)
MCV RBC AUTO: 97.1 FL (ref 81.4–97.8)
MONOCYTES # BLD AUTO: 0.64 K/UL (ref 0–0.85)
MONOCYTES NFR BLD AUTO: 5.5 % (ref 0–13.4)
NEUTROPHILS # BLD AUTO: 8.62 K/UL (ref 1.82–7.42)
NEUTROPHILS NFR BLD: 74.3 % (ref 44–72)
NRBC # BLD AUTO: 0.02 K/UL
NRBC BLD-RTO: 0.2 /100 WBC (ref 0–0.2)
PLATELET # BLD AUTO: 280 K/UL (ref 164–446)
PMV BLD AUTO: 10.1 FL (ref 9–12.9)
POTASSIUM SERPL-SCNC: 4.5 MMOL/L (ref 3.6–5.5)
PROT SERPL-MCNC: 6.3 G/DL (ref 6–8.2)
RBC # BLD AUTO: 4.07 M/UL (ref 4.2–5.4)
SODIUM SERPL-SCNC: 133 MMOL/L (ref 135–145)
WBC # BLD AUTO: 11.6 K/UL (ref 4.8–10.8)

## 2024-12-11 PROCEDURE — 36415 COLL VENOUS BLD VENIPUNCTURE: CPT

## 2024-12-11 PROCEDURE — 700102 HCHG RX REV CODE 250 W/ 637 OVERRIDE(OP): Performed by: STUDENT IN AN ORGANIZED HEALTH CARE EDUCATION/TRAINING PROGRAM

## 2024-12-11 PROCEDURE — 85025 COMPLETE CBC W/AUTO DIFF WBC: CPT

## 2024-12-11 PROCEDURE — 59025 FETAL NON-STRESS TEST: CPT

## 2024-12-11 PROCEDURE — 85613 RUSSELL VIPER VENOM DILUTED: CPT

## 2024-12-11 PROCEDURE — A9270 NON-COVERED ITEM OR SERVICE: HCPCS | Performed by: OBSTETRICS & GYNECOLOGY

## 2024-12-11 PROCEDURE — A9270 NON-COVERED ITEM OR SERVICE: HCPCS | Performed by: STUDENT IN AN ORGANIZED HEALTH CARE EDUCATION/TRAINING PROGRAM

## 2024-12-11 PROCEDURE — 86147 CARDIOLIPIN ANTIBODY EA IG: CPT | Mod: 91

## 2024-12-11 PROCEDURE — 85610 PROTHROMBIN TIME: CPT

## 2024-12-11 PROCEDURE — 85730 THROMBOPLASTIN TIME PARTIAL: CPT

## 2024-12-11 PROCEDURE — 80053 COMPREHEN METABOLIC PANEL: CPT

## 2024-12-11 PROCEDURE — 302790 HCHG STAT ANTEPARTUM CARE, DAILY

## 2024-12-11 PROCEDURE — 86038 ANTINUCLEAR ANTIBODIES: CPT

## 2024-12-11 PROCEDURE — 99231 SBSQ HOSP IP/OBS SF/LOW 25: CPT | Performed by: PHYSICIAN ASSISTANT

## 2024-12-11 PROCEDURE — 700102 HCHG RX REV CODE 250 W/ 637 OVERRIDE(OP): Performed by: OBSTETRICS & GYNECOLOGY

## 2024-12-11 PROCEDURE — 770002 HCHG ROOM/CARE - OB PRIVATE (112)

## 2024-12-11 RX ORDER — NIFEDIPINE 30 MG/1
30 TABLET, EXTENDED RELEASE ORAL NIGHTLY
Status: DISCONTINUED | OUTPATIENT
Start: 2024-12-11 | End: 2024-12-12 | Stop reason: HOSPADM

## 2024-12-11 RX ADMIN — PRENATAL WITH FERROUS FUM AND FOLIC ACID 1 TABLET: 3080; 920; 120; 400; 22; 1.84; 3; 20; 10; 1; 12; 200; 27; 25; 2 TABLET ORAL at 08:27

## 2024-12-11 RX ADMIN — NIFEDIPINE 30 MG: 30 TABLET, FILM COATED, EXTENDED RELEASE ORAL at 21:06

## 2024-12-11 ASSESSMENT — PAIN DESCRIPTION - PAIN TYPE
TYPE: ACUTE PAIN

## 2024-12-11 NOTE — CARE PLAN
Problem: Knowledge Deficit - L&D  Goal: Patient and family/caregivers will demonstrate understanding of plan of care, disease process/condition, diagnostic tests and medications  Outcome: Progressing  Note: Continuous education during care      Problem: Risk for Excess Fluid Volume  Goal: Patient will demonstrate pulse, blood pressure and neurologic signs within expected ranges and without any respiratory complications  Outcome: Progressing  Note: Routine VS, oral hydrating      Problem: Psychosocial - L&D  Goal: Patient's level of anxiety will decrease  Outcome: Progressing  Goal: Patient will be able to discuss coping skills during hospitalization  Outcome: Progressing  Note: Good support from family and staff   Goal: Patient's ability to re-evaluate and adapt role responsibilities will improve  Outcome: Progressing     Problem: Cardiac Output  Goal: Patient will remain normotensive throughout hospitalization  Outcome: Progressing  Note: Routine and close monitoring of VS     Problem: Risk for Injury  Goal: Patient and fetus will be free of preventable injury/complications  Outcome: Progressing  Note: Call light in reach when in need of assistance, pt aware of cords and other tripping hazards in room      Problem: Risk for Venous Thromboembolism (VTE)  Goal: VTE prevention measures will be implemented and patient will remain free from VTE  Outcome: Progressing  Note: SCD on and ambulating      Problem: Pain  Goal: Patient's pain will be alleviated or reduced to the patient’s comfort goal  Outcome: Met     Problem: Risk for Infection and Impaired Wound Healing  Goal: Patient will remain free from infection  Outcome: Met   The patient is Stable - Low risk of patient condition declining or worsening    Shift Goals  Clinical Goals: [place] BP and labs  Patient Goals: stay pregnant  Family Goals: support    Progress made toward(s) clinical / shift goals:  progressing

## 2024-12-11 NOTE — PROGRESS NOTES
"[facility] ANTEPARTUM PROGRESS NOTE;    Sasha Bryant is a 34 y.o. female  at 33w5d.    Patient Active Problem List    Diagnosis Date Noted    History of postpartum hemorrhage 2024    History of  delivery 2024    History of sepsis 2024    Gestational hypertension, third trimester 12/10/2024    Fetal growth restriction antepartum 12/10/2024    Indication for care in labor or delivery 2024         SUBJECTIVE:  Patient seen and examined. She is doing well. No significant events overnight . Denies HA, change in vision, RUQ/epigastric pain, SOB, CP, fever, nausea/vomiting, edema or calf pain.     Contractions: denies   Leaking of fluid: denies   Vaginal bleeding: denies   Fetal movement: present     Review of systems; denies fevers, chills, headaches, vision changes, dizziness, or abdominal pain      Physical examination;  /75 0404  Pulse 69   Temp 36.4 °C (97.5 °F) (Temporal)   Resp 18   Ht 1.6 m (5' 3\")   Wt 85.4 kg (188 lb 4.4 oz)   SpO2 95%   BMI 33.35 kg/m²   Appearance/Psychiatric: appears well and in NAD  Constitutional: The patient is well nourished.  Respiratory: Her respiratory effort is normal.  Gastrointestinal: Soft, gravid, non-tender  Extremities: no edema      NST- pending at time of visit     [facility] Growth US 24   EFW 1768g 5%, FGR, CHIRAG 16.3cm, normal fluid. Normal UA Dopplers     Assessment  IUP AT 33w5d   Ms. Bryant is a 34 y.o. year old female at 33w5d admitted on 24 with gestational HTN, pre-eclampsia with severe features per 24h urine protein of 6762 and a few severe range pressures, and FGR with EFW 5%. Completed BMZ x 2 and stopped magnesium yesterday.  BP has been more stable with no severe ranges yesterday.  Last dose of Procardia IR 10mg given  at 1440. Discussed initiating autoimmune workup to assess cause for proteinuria outside of pre-e as the greatly elevated 24h urine protein results do not correlate with her mostly stable " Pt informed.    BPs.  US 12/9 showed UA Dopplers and CHIRAG WNL.     Plan;  1. Continue observation  2. NST daily   3. Repeat CBC and CMP ordered   4. ANDRE ordered to r/o autoimmune disorder as etiology of proteinuria       Melanie Joe P.A.-C.  [facility] Service   Obstetrics and Gynecology  12/11/2024     9:36 AM      Patient was seen in conjunction with Gabe Platt MD [facility]  who consulted and agrees with the plan.

## 2024-12-11 NOTE — CARE PLAN
The patient is Stable - Low risk of patient condition declining or worsening    Shift Goals  Clinical Goals: BP WNL  Patient Goals: rest, stay pregnant  Family Goals: support    Progress made toward(s) clinical / shift goals:    Problem: Knowledge Deficit - L&D  Goal: Patient and family/caregivers will demonstrate understanding of plan of care, disease process/condition, diagnostic tests and medications  Outcome: Progressing  Note: verbalizes     Problem: Cardiac Output  Goal: Patient will remain normotensive throughout hospitalization  Outcome: Progressing  Note: Bp wnl

## 2024-12-12 ENCOUNTER — PHARMACY VISIT (OUTPATIENT)
Dept: PHARMACY | Facility: MEDICAL CENTER | Age: 34
End: 2024-12-12
Payer: COMMERCIAL

## 2024-12-12 VITALS
HEART RATE: 67 BPM | RESPIRATION RATE: 18 BRPM | SYSTOLIC BLOOD PRESSURE: 142 MMHG | BODY MASS INDEX: 33.36 KG/M2 | HEIGHT: 63 IN | WEIGHT: 188.27 LBS | DIASTOLIC BLOOD PRESSURE: 89 MMHG | OXYGEN SATURATION: 97 % | TEMPERATURE: 97.9 F

## 2024-12-12 PROCEDURE — 700111 HCHG RX REV CODE 636 W/ 250 OVERRIDE (IP): Performed by: PHYSICIAN ASSISTANT

## 2024-12-12 PROCEDURE — 90471 IMMUNIZATION ADMIN: CPT

## 2024-12-12 PROCEDURE — 59025 FETAL NON-STRESS TEST: CPT

## 2024-12-12 PROCEDURE — 700102 HCHG RX REV CODE 250 W/ 637 OVERRIDE(OP): Performed by: STUDENT IN AN ORGANIZED HEALTH CARE EDUCATION/TRAINING PROGRAM

## 2024-12-12 PROCEDURE — A9270 NON-COVERED ITEM OR SERVICE: HCPCS | Performed by: STUDENT IN AN ORGANIZED HEALTH CARE EDUCATION/TRAINING PROGRAM

## 2024-12-12 PROCEDURE — RXMED WILLOW AMBULATORY MEDICATION CHARGE: Performed by: OBSTETRICS & GYNECOLOGY

## 2024-12-12 PROCEDURE — 99238 HOSP IP/OBS DSCHRG MGMT 30/<: CPT | Performed by: OBSTETRICS & GYNECOLOGY

## 2024-12-12 PROCEDURE — 90678 RSV VACC PREF BIVALENT IM: CPT | Performed by: PHYSICIAN ASSISTANT

## 2024-12-12 RX ORDER — NIFEDIPINE 30 MG/1
30 TABLET, EXTENDED RELEASE ORAL DAILY
Qty: 30 TABLET | Refills: 0 | Status: ON HOLD | OUTPATIENT
Start: 2024-12-12 | End: 2024-12-21

## 2024-12-12 RX ADMIN — PRENATAL WITH FERROUS FUM AND FOLIC ACID 1 TABLET: 3080; 920; 120; 400; 22; 1.84; 3; 20; 10; 1; 12; 200; 27; 25; 2 TABLET ORAL at 08:20

## 2024-12-12 RX ADMIN — RESPIRATORY SYNCYTIAL VIRUS VACCINE 0.5 ML: KIT at 13:27

## 2024-12-12 ASSESSMENT — PAIN DESCRIPTION - PAIN TYPE
TYPE: ACUTE PAIN
TYPE: ACUTE PAIN

## 2024-12-12 NOTE — PROGRESS NOTES
"1900: Report received from ASHLYN Hernandez. Pt denies HA, blurry vision, R epigastric pain, difficulty breathing, and increase in swelling. States + FM, -uterine contractions, -VB.    2020: MD Lc notified of elevated BPs. Orders received for 30mg Nifedipine nightly.     0001: Called to bedside by CNA. Per CNA pt complaining of spots in her vision and a headache. Pt states she gets \"optical headaches\" at home and this is similar to that, but she has never had one immediately after waking up. BP elevated. Cuff set to retake in 10 mins.     0023: Pt states the spots in her vision have resided, but the headache lingers. Declines medication and wants to see if it will resolve on its own.    0700: Report given to ASHLYN Hernandez.  "

## 2024-12-12 NOTE — CARE PLAN
The patient is Stable - Low risk of patient condition declining or worsening    Shift Goals  Clinical Goals: VSS  Patient Goals: Healthy mom, healthy baby  Family Goals: Support    Progress made toward(s) clinical / shift goals:  Progressing    Problem: Knowledge Deficit - L&D  Goal: Patient and family/caregivers will demonstrate understanding of plan of care, disease process/condition, diagnostic tests and medications  Outcome: Progressing  Note: Pt questions and concerns answered. Will continue to update and include pt in POC.     Problem: Risk for Injury  Goal: Patient and fetus will be free of preventable injury/complications  Outcome: Progressing  Note: NST Qshift

## 2024-12-12 NOTE — PROGRESS NOTES
[facility] ANTEPARTUM PROGRESS NOTE;    Sasha Bryant is a 34 y.o. female  at 33w6d.    Patient Active Problem List    Diagnosis Date Noted    History of postpartum hemorrhage 2024    History of  delivery 2024    History of sepsis 2024    History of loop electrical excision procedure (LEEP) 2024    Gestational hypertension, third trimester 12/10/2024    Fetal growth restriction antepartum 12/10/2024    Indication for care in labor or delivery 2024         SUBJECTIVE:  Patient seen and examined. She is doing well. BP was labile overnight, was started on Nifedipine last night. Admits to mild headache consistent with her chronic migraine with aura that has self resolved. Denies change in vision, RUQ/epigastric pain, SOB, CP, fever, nausea/vomiting, edema or calf pain.     Contractions: denies   Leaking of fluid: denies   Vaginal bleeding: denies   Fetal movement: present     Review of systems; denies fevers, chills, headaches, vision changes, dizziness, or abdominal pain    Current Facility-Administered Medications   Medication Dose Route Frequency Last Admin    NIFEdipine SR (Procadia-XL) tablet 30 mg  30 mg Oral Nightly 30 mg at 24 2106    prenatal plus vitamin (Stuartnatal 1+1) 27-1 MG tablet 1 Tablet  1 Tablet Oral Daily-0800 1 Tablet at 24 0820    NIFEdipine IR (Procardia) capsule 10 mg  10 mg Oral Q20MIN PRN 10 mg at 24 1440    Or    labetalol (Normodyne/Trandate) injection 20-80 mg  20-80 mg IV Q10 MIN PRN      Or    hydrALAZINE (Apresoline) injection 5-10 mg  5-10 mg IV Q20MIN PRN      magnesium sulfate 40 g/1000mL infusion  2 g/hr IV Continuous Infusion Ended Prior to Shift at 12/10/24 1900    sodium chloride (Ocean) 0.65 % nasal spray 2 Spray  2 Spray Nasal Q2HRS PRN 2 Spray at 24 1817    acetaminophen (Tylenol) tablet 1,000 mg  1,000 mg Oral Q6HRS PRN 1,000 mg at 24 2135       Physical examination;  /86 0730 , 140/87 0626 , 132/75  "0526 , 123/74 0456 , 138/69 0426 , 152/77 0408 , 167/84 0406 , 152/77 , 132/76 0349 , 134/80 0339 , 131/74 0319,  132/77 0309 , 136/81 0259 , 175/88 0238 , 157/84 0206 , 127/71 0150 , 143/71 0135, 132/66 0120 , 147/80 0101 , 147/80 0100 , 140/84 0012 , 170/85 0001   Pulse 75   Temp 36.3 °C (97.3 °F) (Temporal)   Resp 18   Ht 1.6 m (5' 3\")   Wt 85.4 kg (188 lb 4.4 oz)   SpO2 96%   BMI 33.35 kg/m²   Appearance/Psychiatric: appears well and in NAD  Constitutional: The patient is well nourished.  Respiratory: Her respiratory effort is normal.  Gastrointestinal: Soft, gravid, non-tender  Extremities: no edema     Latest Reference Range & Units 12/11/24 12:29   WBC 4.8 - 10.8 K/uL 11.6 (H)   RBC 4.20 - 5.40 M/uL 4.07 (L)   Hemoglobin 12.0 - 16.0 g/dL 13.0   Hematocrit 37.0 - 47.0 % 39.5   MCV 81.4 - 97.8 fL 97.1   MCH 27.0 - 33.0 pg 31.9   MCHC 32.2 - 35.5 g/dL 32.9   RDW 35.9 - 50.0 fL 46.5   Platelet Count 164 - 446 K/uL 280   MPV 9.0 - 12.9 fL 10.1   Neutrophils-Polys 44.00 - 72.00 % 74.30 (H)   Neutrophils (Absolute) 1.82 - 7.42 K/uL 8.62 (H)   Lymphocytes 22.00 - 41.00 % 18.50 (L)   Lymphs (Absolute) 1.00 - 4.80 K/uL 2.14   Monocytes 0.00 - 13.40 % 5.50   Monos (Absolute) 0.00 - 0.85 K/uL 0.64   Eosinophils 0.00 - 6.90 % 0.80   Eos (Absolute) 0.00 - 0.51 K/uL 0.09   Basophils 0.00 - 1.80 % 0.30   Baso (Absolute) 0.00 - 0.12 K/uL 0.03   Immature Granulocytes 0.00 - 0.90 % 0.60   Immature Granulocytes (abs) 0.00 - 0.11 K/uL 0.07   Nucleated RBC 0.00 - 0.20 /100 WBC 0.20   NRBC (Absolute) K/uL 0.02   Sodium 135 - 145 mmol/L 133 (L)   Potassium 3.6 - 5.5 mmol/L 4.5   Chloride 96 - 112 mmol/L 101   Co2 20 - 33 mmol/L 20   Anion Gap 7.0 - 16.0  12.0   Glucose 65 - 99 mg/dL 87   Bun 8 - 22 mg/dL 16   Creatinine 0.50 - 1.40 mg/dL 0.83   GFR (CKD-EPI) >60 mL/min/1.73 m 2 94   Calcium 8.5 - 10.5 mg/dL 8.4 (L)   Correct Calcium 8.5 - 10.5 mg/dL 9.1   AST(SGOT) 12 - 45 U/L 24   ALT(SGPT) 2 - 50 U/L 17   Alkaline " Phosphatase 30 - 99 U/L 138 (H)   Total Bilirubin 0.1 - 1.5 mg/dL <0.2   Albumin 3.2 - 4.9 g/dL 3.1 (L)   Total Protein 6.0 - 8.2 g/dL 6.3   Globulin 1.9 - 3.5 g/dL 3.2   A-G Ratio g/dL 1.0       NST- pending, on monitor at time of visit    MF Growth US 12/9/24   EFW 1768g 5%, FGR, CHIRAG 16.3cm, normal fluid. Normal UA Dopplers.    Assessment  IUP AT 33w6d   Ms. Bryant is a 34 y.o. year old female at 33w6d admitted on 12/8/24 with gestational HTN, pre-eclampsia with severe features per 24h urine protein of 6762 and severe range pressures, and FGR with EFW 5%. Completed BMZ x 2 on 12/9 and stopped magnesium 12/10. CHIRAG and UA Dopplers 12/9 WNL.     BP was labial overnight fluctuating from severe ranges to normal. She was given nifedipine XL 30mg at 2106 but never required prn BP med as recheck after an elevated reading was almost always normal.     Plan;  1. Continue observation, Discussed possibly discharging home today if BP is stable throughout the day with home BP meds and a home BP cuff to be checked twice daily at varying times of day. Pt will need NSTs, Dopplers, and CHIRAG weekly.   2. NST daily   3. ANDRE pending to r/o other etiology of greatly elevated proteinuria outside of pre-e   4. Continue on nifedipine   5. RSV vaccine ordered, advised it is difficult to get this as inpatient, [facility]   6. Plan for delivery at 37 weeks via repeat c/s pending BP control       Melanie Joe P.A.-C.  [facility] Service   Obstetrics and Gynecology  12/12/2024     9:21 AM      Patient was seen in conjunction with Gabe Platt MD [facility]  who consulted and agrees with the plan.

## 2024-12-12 NOTE — PROGRESS NOTES
0700: Received report from Keeley GOLDBERG, plan of care discussed. Call light in reach.     0815: Pt denies HA, vision changes, RUQ pain. Minimal swelling in feet. DTR+2, no clonus. Pt knows to notify RN if anything changes in status.     1635: Orders to discharge pt.     1700: Discharge instructions discussed with pt and FOB. Pt verbalized understanding of instructions. Pt understands to  prescription from the pharmacy here before going home.

## 2024-12-12 NOTE — CARE PLAN
Problem: Knowledge Deficit - L&D  Goal: Patient and family/caregivers will demonstrate understanding of plan of care, disease process/condition, diagnostic tests and medications  Outcome: Progressing  Note: Continuous education with care      Problem: Risk for Excess Fluid Volume  Goal: Patient will demonstrate pulse, blood pressure and neurologic signs within expected ranges and without any respiratory complications  Outcome: Progressing  Note: Routine VS and BP, new scheduled BP meds     Problem: Psychosocial - L&D  Goal: Patient's level of anxiety will decrease  Outcome: Progressing  Goal: Patient will be able to discuss coping skills during hospitalization  Outcome: Progressing  Note: Good support from family and staff   Goal: Patient's ability to re-evaluate and adapt role responsibilities will improve  Outcome: Progressing     Problem: Cardiac Output  Goal: Patient will remain normotensive throughout hospitalization  Outcome: Progressing  Note: Monitoring Bps      Problem: Risk for Fluid Imbalance  Goal: Patient's fluid volume balance will be maintained or improve  Outcome: Progressing  Note: Oral hydrating      Problem: Risk for Injury  Goal: Patient and fetus will be free of preventable injury/complications  Outcome: Progressing  Note: Call light in reach when in need of assistance, pt aware of cords and other tripping hazards in room       Problem: Risk for Venous Thromboembolism (VTE)  Goal: VTE prevention measures will be implemented and patient will remain free from VTE  Outcome: Progressing  Note: Pt able to ambulate      The patient is Stable - Low risk of patient condition declining or worsening    Shift Goals  Clinical Goals: VSS  Patient Goals: Healthy mom, healthy baby  Family Goals: Support    Progress made toward(s) clinical / shift goals:  progressing

## 2024-12-12 NOTE — PROGRESS NOTES
0700: received report from Neelima GOLDBERG, plan of care discussed. .    1900: Reprot given to Keeley GOLDBERG, plan of care discussed.

## 2024-12-13 ENCOUNTER — HOSPITAL ENCOUNTER (INPATIENT)
Facility: MEDICAL CENTER | Age: 34
LOS: 7 days | End: 2024-12-21
Attending: OBSTETRICS & GYNECOLOGY | Admitting: OBSTETRICS & GYNECOLOGY
Payer: COMMERCIAL

## 2024-12-13 DIAGNOSIS — O36.5990 FETAL GROWTH RESTRICTION ANTEPARTUM: ICD-10-CM

## 2024-12-13 LAB
BASOPHILS # BLD AUTO: 0.4 % (ref 0–1.8)
BASOPHILS # BLD: 0.04 K/UL (ref 0–0.12)
CARDIOLIPIN IGA SER IA-ACNC: <10 APL
CARDIOLIPIN IGG SER IA-ACNC: <10 GPL
CARDIOLIPIN IGM SER IA-ACNC: <10 MPL
EOSINOPHIL # BLD AUTO: 0.11 K/UL (ref 0–0.51)
EOSINOPHIL NFR BLD: 1.1 % (ref 0–6.9)
ERYTHROCYTE [DISTWIDTH] IN BLOOD BY AUTOMATED COUNT: 43 FL (ref 35.9–50)
HCT VFR BLD AUTO: 38.6 % (ref 37–47)
HGB BLD-MCNC: 13.1 G/DL (ref 12–16)
HOLDING TUBE BB 8507: NORMAL
HOLDING TUBE BB 8507: NORMAL
IMM GRANULOCYTES # BLD AUTO: 0.06 K/UL (ref 0–0.11)
IMM GRANULOCYTES NFR BLD AUTO: 0.6 % (ref 0–0.9)
LYMPHOCYTES # BLD AUTO: 1.86 K/UL (ref 1–4.8)
LYMPHOCYTES NFR BLD: 19.2 % (ref 22–41)
MCH RBC QN AUTO: 31.6 PG (ref 27–33)
MCHC RBC AUTO-ENTMCNC: 33.9 G/DL (ref 32.2–35.5)
MCV RBC AUTO: 93.2 FL (ref 81.4–97.8)
MONOCYTES # BLD AUTO: 0.68 K/UL (ref 0–0.85)
MONOCYTES NFR BLD AUTO: 7 % (ref 0–13.4)
NEUTROPHILS # BLD AUTO: 6.95 K/UL (ref 1.82–7.42)
NEUTROPHILS NFR BLD: 71.7 % (ref 44–72)
NRBC # BLD AUTO: 0 K/UL
NRBC BLD-RTO: 0 /100 WBC (ref 0–0.2)
NUCLEAR IGG SER QL IA: NORMAL
PLATELET # BLD AUTO: 271 K/UL (ref 164–446)
PMV BLD AUTO: 9.9 FL (ref 9–12.9)
RBC # BLD AUTO: 4.14 M/UL (ref 4.2–5.4)
WBC # BLD AUTO: 9.7 K/UL (ref 4.8–10.8)

## 2024-12-13 PROCEDURE — A9270 NON-COVERED ITEM OR SERVICE: HCPCS | Performed by: OBSTETRICS & GYNECOLOGY

## 2024-12-13 PROCEDURE — G0378 HOSPITAL OBSERVATION PER HR: HCPCS

## 2024-12-13 PROCEDURE — 82570 ASSAY OF URINE CREATININE: CPT

## 2024-12-13 PROCEDURE — 80053 COMPREHEN METABOLIC PANEL: CPT

## 2024-12-13 PROCEDURE — 36415 COLL VENOUS BLD VENIPUNCTURE: CPT

## 2024-12-13 PROCEDURE — 84156 ASSAY OF PROTEIN URINE: CPT

## 2024-12-13 PROCEDURE — 85025 COMPLETE CBC W/AUTO DIFF WBC: CPT

## 2024-12-13 PROCEDURE — 302449 STATCHG TRIAGE ONLY (STATISTIC)

## 2024-12-13 PROCEDURE — 700102 HCHG RX REV CODE 250 W/ 637 OVERRIDE(OP): Performed by: OBSTETRICS & GYNECOLOGY

## 2024-12-13 RX ORDER — NIFEDIPINE 30 MG/1
30 TABLET, EXTENDED RELEASE ORAL ONCE
Status: ACTIVE | OUTPATIENT
Start: 2024-12-14 | End: 2024-12-15

## 2024-12-13 RX ORDER — NIFEDIPINE 30 MG/1
60 TABLET, EXTENDED RELEASE ORAL
Status: DISCONTINUED | OUTPATIENT
Start: 2024-12-14 | End: 2024-12-18

## 2024-12-13 RX ORDER — HYDRALAZINE HYDROCHLORIDE 20 MG/ML
5-10 INJECTION INTRAMUSCULAR; INTRAVENOUS
Status: DISCONTINUED | OUTPATIENT
Start: 2024-12-13 | End: 2024-12-18

## 2024-12-13 RX ORDER — LABETALOL HYDROCHLORIDE 5 MG/ML
20-80 INJECTION, SOLUTION INTRAVENOUS
Status: DISCONTINUED | OUTPATIENT
Start: 2024-12-13 | End: 2024-12-18

## 2024-12-13 RX ORDER — NIFEDIPINE 10 MG/1
10 CAPSULE ORAL
Status: DISCONTINUED | OUTPATIENT
Start: 2024-12-13 | End: 2024-12-18

## 2024-12-13 RX ADMIN — NIFEDIPINE 10 MG: 10 CAPSULE ORAL at 22:37

## 2024-12-13 ASSESSMENT — PAIN SCALES - GENERAL: PAINLEVEL: 0 - NO PAIN

## 2024-12-13 ASSESSMENT — FIBROSIS 4 INDEX: FIB4 SCORE: .7276068751089989205

## 2024-12-13 NOTE — DISCHARGE SUMMARY
OB Discharge Summary:      Sasha Bryant      Admit Date:   2024  Discharge Date:  2024     Admitting diagnosis:    Preeclampsia w Severe Features    Discharge Diagnosis:   1. Preeclampsia w/o Severe Features        History:  No past medical history on file.  OB History    Para Term  AB Living   2 1 1     1   SAB IAB Ectopic Molar Multiple Live Births                    # Outcome Date GA Lbr Joss/2nd Weight Sex Type Anes PTL Lv   2 Current            1 Term     M [id number]  N       Complications: Intraamniotic Infection, Failure to Progress in Second Stage        [place]  Patient Active Problem List    Diagnosis Date Noted    History of postpartum hemorrhage 2024    History of  delivery 2024    History of sepsis 2024    History of loop electrical excision procedure (LEEP) 2024    Gestational hypertension, third trimester 12/10/2024    Fetal growth restriction antepartum 12/10/2024    Indication for care in labor or delivery 2024        Hospital Course:   34 y.o. , was admitted with elevate blood pressures with suspected preeclampsia with sever features based on BP. She was transferred from [facility]. Started on magnesium and steroids.  During the hospitalizations, her BP was stable and US showed FGR at 5%, normal dopplers and fluid. FHR appeared reassuring.  She was started on Nifedipine to get slightly better BP control  She denied any HA, epigastric pain or visual changes.  Will d/c home. PIH instructions given. She will check her BP daily and f/u in 1 week for Doppler and CHIRAG.  Labs obtained to r/o autoimmune disorder due to proteinuria noted    Complications during pregnancy:  Patient Active Problem List   Diagnosis    Indication for care in labor or delivery    Gestational hypertension, third trimester    Fetal growth restriction antepartum    History of postpartum hemorrhage    History of  delivery    History of sepsis    History  of loop electrical excision procedure (LEEP)       Vitals:    12/12/24 0626 12/12/24 0730 12/12/24 1225 12/12/24 1615   BP: (!) 140/87 135/86 138/87 (!) 142/89   Pulse: 72 75 80 67   Resp:  18 16 18   Temp:  36.3 °C (97.3 °F) 36.7 °C (98 °F) 36.6 °C (97.9 °F)   TempSrc:  Temporal Temporal Temporal   SpO2:  96% 95% 97%   Weight:       Height:             Exam:  Gen: AAO in NAD  Breast Exam: deferred  Abdomen: Abdomen soft, non-tender. BS normal. No masses,  No organomegaly, negative findings: soft, non-tender  Fundus Non Tender: yes  Extremity: Non tender     Labs:  Recent Labs     12/11/24  1229   WBC 11.6*   RBC 4.07*   HEMOGLOBIN 13.0   HEMATOCRIT 39.5   MCV 97.1   MCH 31.9   MCHC 32.9   RDW 46.5   PLATELETCT 280   MPV 10.1        Follow up:   F/U in 1 week for Doppler, NST and CHIRAG     Discharge Meds:   Current Outpatient Medications   Medication Sig Dispense Refill    NIFEdipine SR (PROCADIA-XL) 30 MG tablet Take 1 Tablet by mouth every day. 30 Tablet 0       Gabe Platt M.D.

## 2024-12-13 NOTE — DISCHARGE INSTRUCTIONS
Labor  Pregnancy normally lasts 39-41 weeks.  labor is when labor starts before you have been pregnant for 37 weeks. Babies who are born too early may have a higher risk for long-term problems like cerebral palsy or developmental delays. They may also have problems soon after birth, such as problems with blood sugar, body temperature, heart, and breathing. These problems may be very serious in babies who are born before 34 weeks of pregnancy.  What are the causes?  The cause of this condition is not known.  What increases the risk?  You are more likely to have  labor if:  You have medical problems, now or in the past.  You have problems now or in your past pregnancies.  You have lifestyle problems.  Medical history  You have problems of the womb (uterus).  You have an infection, including infections you get from sex.  You have problems that do not go away, such as:  Blood clots.  High blood pressure.  High blood sugar.  You have low body weight or too much body weight.  Present and past pregnancies  You have had  labor before.  You are pregnant with two babies or more.  You have a condition in which the placenta covers your cervix.  You waited less than 18 months between giving birth and becoming pregnant again.  Your unborn baby has some problems.  You have bleeding from your vagina.  You became pregnant by a method called IVF.  Lifestyle  You smoke.  You drink alcohol.  You use drugs.  You have stress.  You have abuse in your home.  You come in contact with chemicals that harm the body (pollutants).  Other factors  You are younger than 17 years or older than 35 years.  What are the signs or symptoms?  Symptoms of this condition include:  Cramps. The cramps may feel like cramps from a period. You may also have watery poop (diarrhea).  Pain in the belly (abdomen).  Pain in the lower back.  Regular contractions. It may feel like your belly is getting tighter.  Pressure in the lower  belly.  More fluid leaking from the vagina. The fluid may be watery or bloody.  Water breaking.  How is this treated?  Treatment for this condition depends on your health, the health of your baby, and how old your pregnancy is. It may include:  Taking medicines, such as:  Hormone medicines.  Medicines to stop contractions.  Medicines to help mature the baby's lungs.  Medicines to prevent your baby from getting cerebral palsy or other problems.  Bed rest. If the labor happens before 34 weeks of pregnancy, you may need to stay in the hospital.  Delivering the baby.  Follow these instructions at home:    Do not smoke or use any products that contain nicotine or tobacco. If you need help quitting, ask your doctor.  Do not drink alcohol.  Take over-the-counter and prescription medicines only as told by your doctor.  Rest as told by your doctor.  Return to your normal activities when your doctor says that it is safe.  Keep all follow-up visits.  How is this prevented?  To have a healthy pregnancy:  Do not use drugs.  Do not use any medicines unless you ask your doctor if they are safe for you.  Talk with your doctor before taking any herbal supplements.  Make sure you gain enough weight.  Watch for infection. If you think you might have an infection, get it checked right away. Symptoms of infection may include:  Fever.  Vaginal discharge that smells bad or is not normal.  Pain or burning when you pee.  Needing to pee urgently.  Needing to pee often.  Peeing small amounts often.  Blood in your pee.  Pee that smells bad or unusual.  Where to find more information  [place] Department of Health and Human Services Office on Women's Health: www.womenshealth.gov  The American College of Obstetricians and Gynecologists: www.acog.org  Centers for Disease Control and Prevention: www.cdc.gov  Contact a doctor if:  You think you are going into  labor.  You have symptoms of  labor.  You have symptoms of infection.  Get help  right away if:  You are having painful contractions every 5 minutes or less.  Your water breaks.  Summary   labor is labor that starts before you reach 37 weeks of pregnancy.  Your baby may have problems if delivered early.  You are more likely to have  labor if you have certain medical problems or problems with a pregnancy now or in the past. Some lifestyle factors can also increase the risk.  Contact a doctor if you have symptoms of  labor.  This information is not intended to replace advice given to you by your health care provider. Make sure you discuss any questions you have with your health care provider.  Document Revised: 2021 Document Reviewed: 2021  Elsevier Patient Education ©  [employer]                    Preeclampsia and Eclampsia  Preeclampsia is a serious condition that may develop during pregnancy. This condition involves high blood pressure during pregnancy and causes symptoms such as headaches, vision changes, and increased swelling in the legs, hands, and face. Preeclampsia occurs after 20 weeks of pregnancy. Eclampsia is a seizure that happens from worsening preeclampsia. Diagnosing and managing preeclampsia early is important. If not treated early, it can cause serious problems for mother and baby.  There is no cure for this condition. However, during pregnancy, delivering the baby may be the best treatment for preeclampsia or eclampsia. For most women, symptoms of preeclampsia and eclampsia go away after giving birth. In rare cases, a woman may develop preeclampsia or eclampsia after giving birth. This usually occurs within 48 hours after childbirth but may occur up to 6 weeks after giving birth.  What are the causes?  The cause of this condition is not known.  What increases the risk?  The following factors make you more likely to develop preeclampsia:  Being pregnant for the first time or being pregnant with multiples.  Having had preeclampsia or a  condition called hemolysis, elevated liver enzymes, and low platelet count (HELLP)syndrome during a past pregnancy.  Having a family history of preeclampsia.  Being older than age 35.  Being obese.  Becoming pregnant through fertility treatments.  Conditions that reduce blood flow or oxygen to your placenta and baby may also increase your risk. These include:  High blood pressure before, during, or immediately following pregnancy.  Kidney disease.  Diabetes.  Blood clotting disorders.  Autoimmune diseases, such as lupus.  Sleep apnea.  What are the signs or symptoms?  Common symptoms of this condition include:  A severe, throbbing headache that does not go away.  Vision problems, such as blurred or double vision and light sensitivity.  Pain in the stomach, especially the right upper region.  Pain in the shoulder.  Other symptoms that may develop as the condition gets worse include:  Sudden weight gain because of fluid buildup in the body. This causes swelling of the face, hands, legs, and feet.  Severe nausea and vomiting.  Urinating less than usual.  Shortness of breath.  Seizures.  How is this diagnosed?  Your health care provider will ask you about symptoms and check for signs of preeclampsia during your prenatal visits. You will also have routine tests, including:  Checking your blood pressure.  Urine tests to check for protein.  Blood tests to assess your organ function.  Monitoring your baby's heart rate.  Ultrasounds to check fetal growth.  How is this treated?  You and your health care provider will determine the treatment that is best for you. Treatment may include:  Frequent prenatal visits to check for preeclampsia.  Medicine to lower your blood pressure.  Medicine to prevent seizures.  Low-dose aspirin during your pregnancy.  Staying in the hospital, in severe cases. You will be given medicines to control your blood pressure and the amount of fluids in your body.  Delivering your baby.  Work with your  health care provider to manage any chronic health conditions, such as diabetes or kidney problems. Also, work with your health care provider to manage weight gain during pregnancy.  Follow these instructions at home:  Eating and drinking  Drink enough fluid to keep your urine pale yellow.  Avoid caffeine. Caffeine may increase blood pressure and heart rate and lead to dehydration.  Reduce the amount of salt that you eat.  Lifestyle  Do not use any products that contain nicotine or tobacco. These products include cigarettes, chewing tobacco, and vaping devices, such as e-cigarettes. If you need help quitting, ask your health care provider.  Do not use alcohol or drugs.  Avoid stress as much as possible.  Rest and get plenty of sleep.  General instructions    Take over-the-counter and prescription medicines only as told by your health care provider.  When lying down, lie on your left side. This keeps pressure off your major blood vessels.  When sitting or lying down, raise (elevate) your feet. Try putting pillows underneath your lower legs.  Exercise regularly. Ask your health care provider what kinds of exercise are best for you.  Check your blood pressure as often as recommended by your health care provider.  Keep all prenatal and follow-up visits. This is important.  Contact a health care provider if:  You have symptoms that may need treatment or closer monitoring. These include:  Headaches.  Stomach pain or nausea and vomiting.  Shoulder pain.  Vision problems, such as spots in front of your eyes or blurry vision.  Sudden weight gain or increased swelling in your face, hands, legs, and feet.  Increased anxiety or feeling of impending doom.  Signs or symptoms of labor.  Get help right away if:  You have any of the following symptoms:  A seizure.  Shortness of breath or trouble breathing.  Trouble speaking or slurred speech.  Fainting.  Chest pain.  These symptoms may represent a serious problem that is an  emergency. Do not wait to see if the symptoms will go away. Get medical help right away. Call your local emergency services (911 in the [place] Do not drive yourself to the hospital.  Summary  Preeclampsia is a serious condition that may develop during pregnancy.  Diagnosing and treating preeclampsia early is very important.  Keep all prenatal and follow-up visits. This is important.  Get help right away if you have a seizure, shortness of breath or trouble breathing, trouble speaking or slurred speech, chest pain, or fainting.  This information is not intended to replace advice given to you by your health care provider. Make sure you discuss any questions you have with your health care provider.  Document Revised: 09/09/2021 Document Reviewed: 09/09/2021  Elsevier Patient Education © 2023 [employer]

## 2024-12-14 LAB
ALBUMIN SERPL BCP-MCNC: 3 G/DL (ref 3.2–4.9)
ALBUMIN/GLOB SERPL: 1 G/DL
ALP SERPL-CCNC: 135 U/L (ref 30–99)
ALT SERPL-CCNC: 14 U/L (ref 2–50)
ANION GAP SERPL CALC-SCNC: 13 MMOL/L (ref 7–16)
APTT SCREEN TO CONFIRM RATIO: 0.84 {RATIO}
AST SERPL-CCNC: 20 U/L (ref 12–45)
BILIRUB SERPL-MCNC: <0.2 MG/DL (ref 0.1–1.5)
BUN SERPL-MCNC: 15 MG/DL (ref 8–22)
CALCIUM ALBUM COR SERPL-MCNC: 9.4 MG/DL (ref 8.5–10.5)
CALCIUM SERPL-MCNC: 8.6 MG/DL (ref 8.5–10.5)
CHLORIDE SERPL-SCNC: 104 MMOL/L (ref 96–112)
CO2 SERPL-SCNC: 17 MMOL/L (ref 20–33)
CONFIRM DRVVT STA-STACLOT: NORMAL S
CREAT SERPL-MCNC: 0.59 MG/DL (ref 0.5–1.4)
CREAT UR-MCNC: 60.02 MG/DL
DRVVT SCREEN TO CONFIRM RATIO: 0.76 {RATIO}
DRVVT SCREEN TO CONFIRM RATIO: NORMAL {RATIO}
DRVVT/DRVVT CFM P DOAC NEUT NORM PPP-RTO: NORMAL {RATIO}
GFR SERPLBLD CREATININE-BSD FMLA CKD-EPI: 120 ML/MIN/1.73 M 2
GLOBULIN SER CALC-MCNC: 3.1 G/DL (ref 1.9–3.5)
GLUCOSE SERPL-MCNC: 81 MG/DL (ref 65–99)
HEPARIN NT PPP QL: NORMAL
LA 3 SCREEN W REFLEX-IMP: NORMAL
LMW HEPARIN IND PLT AB SER QL: NORMAL
MIXING DRVVT: NORMAL S
POTASSIUM SERPL-SCNC: 4.7 MMOL/L (ref 3.6–5.5)
PROT SERPL-MCNC: 6.1 G/DL (ref 6–8.2)
PROT UR-MCNC: 551 MG/DL (ref 0–15)
PROT/CREAT UR: 9180 MG/G (ref 10–107)
PROTHROMBIN TIME: 12 S (ref 12–15.5)
SCREEN APTT: NORMAL S
SODIUM SERPL-SCNC: 134 MMOL/L (ref 135–145)
THROMBIN TIME: NORMAL S

## 2024-12-14 PROCEDURE — 700102 HCHG RX REV CODE 250 W/ 637 OVERRIDE(OP): Performed by: OBSTETRICS & GYNECOLOGY

## 2024-12-14 PROCEDURE — A9270 NON-COVERED ITEM OR SERVICE: HCPCS | Performed by: OBSTETRICS & GYNECOLOGY

## 2024-12-14 PROCEDURE — 770002 HCHG ROOM/CARE - OB PRIVATE (112)

## 2024-12-14 PROCEDURE — 59025 FETAL NON-STRESS TEST: CPT

## 2024-12-14 PROCEDURE — 99221 1ST HOSP IP/OBS SF/LOW 40: CPT | Performed by: OBSTETRICS & GYNECOLOGY

## 2024-12-14 PROCEDURE — 302790 HCHG STAT ANTEPARTUM CARE, DAILY

## 2024-12-14 RX ORDER — VITAMIN A ACETATE, BETA CAROTENE, ASCORBIC ACID, CHOLECALCIFEROL, .ALPHA.-TOCOPHEROL ACETATE, DL-, THIAMINE MONONITRATE, RIBOFLAVIN, NIACINAMIDE, PYRIDOXINE HYDROCHLORIDE, FOLIC ACID, CYANOCOBALAMIN, CALCIUM CARBONATE, FERROUS FUMARATE, ZINC OXIDE, CUPRIC OXIDE 3080; 12; 120; 400; 1; 1.84; 3; 20; 22; 920; 25; 200; 27; 10; 2 [IU]/1; UG/1; MG/1; [IU]/1; MG/1; MG/1; MG/1; MG/1; MG/1; [IU]/1; MG/1; MG/1; MG/1; MG/1; MG/1
1 TABLET, FILM COATED ORAL
Status: DISCONTINUED | OUTPATIENT
Start: 2024-12-15 | End: 2024-12-21 | Stop reason: HOSPADM

## 2024-12-14 RX ORDER — ACETAMINOPHEN 325 MG/1
650 TABLET ORAL EVERY 4 HOURS PRN
Status: DISCONTINUED | OUTPATIENT
Start: 2024-12-14 | End: 2024-12-21 | Stop reason: HOSPADM

## 2024-12-14 RX ADMIN — NIFEDIPINE 60 MG: 30 TABLET, FILM COATED, EXTENDED RELEASE ORAL at 06:04

## 2024-12-14 RX ADMIN — ACETAMINOPHEN 650 MG: 325 TABLET ORAL at 12:53

## 2024-12-14 ASSESSMENT — PAIN DESCRIPTION - PAIN TYPE
TYPE: ACUTE PAIN

## 2024-12-14 ASSESSMENT — PATIENT HEALTH QUESTIONNAIRE - PHQ9
2. FEELING DOWN, DEPRESSED, IRRITABLE, OR HOPELESS: NOT AT ALL
SUM OF ALL RESPONSES TO PHQ9 QUESTIONS 1 AND 2: 0
1. LITTLE INTEREST OR PLEASURE IN DOING THINGS: NOT AT ALL
2. FEELING DOWN, DEPRESSED, IRRITABLE, OR HOPELESS: NOT AT ALL
SUM OF ALL RESPONSES TO PHQ9 QUESTIONS 1 AND 2: 0
1. LITTLE INTEREST OR PLEASURE IN DOING THINGS: NOT AT ALL

## 2024-12-14 ASSESSMENT — FIBROSIS 4 INDEX: FIB4 SCORE: 0.69

## 2024-12-14 ASSESSMENT — LIFESTYLE VARIABLES: ALCOHOL_USE: NO

## 2024-12-14 NOTE — CARE PLAN
Problem: Knowledge Deficit - L&D  Goal: Patient and family/caregivers will demonstrate understanding of plan of care, disease process/condition, diagnostic tests and medications  Outcome: Progressing     Problem: Psychosocial - L&D  Goal: Patient's level of anxiety will decrease  Outcome: Progressing     Problem: Cardiac Output  Goal: Patient will remain normotensive throughout hospitalization  Outcome: Progressing     Problem: Pain  Goal: Patient's pain will be alleviated or reduced to the patient’s comfort goal  Outcome: Progressing     Problem: Risk for Infection and Impaired Wound Healing  Goal: Patient will remain free from infection  Outcome: Progressing  Goal: Patient's wound/surgical incision will decrease in size and heals properly  Outcome: Progressing   The patient is Watcher - Medium risk of patient condition declining or worsening         Progress made toward(s) clinical / shift goals:  patient blood pressures WNL with increase in blood pressure medications, patient states HA is gone. Denies any vision changes     Patient is not progressing towards the following goals:na

## 2024-12-14 NOTE — CONSULTS
Requesting Physician: Jacob Chavez MD    Thank you for your consultation request on Ms. Sasha Bryant I had a chance to see her at the [facility] on 24. As you recall, Ms. Bryant is a 34 y.o. y/o, female, G 2 P 1 with an NASIR Estimated Date of Delivery: 25 and an EGA 34w1d  who was admitted for preeclampsia  She was recently discharged, however, she had elevated BP's at home and she came in. She required labetalol on admission, has been stable since then. She is on Nifedipine and was increased to 60 mg.    Past OB History:   OB History    Para Term  AB Living   2 1 1     1   SAB IAB Ectopic Molar Multiple Live Births                    # Outcome Date GA Lbr Joss/2nd Weight Sex Type Anes PTL Lv   2 Current            1 Term     M CS-LTranv  N       Complications: Intraamniotic Infection, Failure to Progress in Second Stage       Past Gyn History: Denies any history of sexually transmitted disease    Past Medical History:   No past medical history on file.     Past Surgical History:   No past surgical history on file.    Allergies:   Copper    Medications:     Current Facility-Administered Medications:     NIFEdipine IR (Procardia) capsule 10 mg, 10 mg, Oral, Q20MIN PRN, 10 mg at 24 2237 **OR** labetalol (Normodyne/Trandate) injection 20-80 mg, 20-80 mg, Intravenous, Q10 MIN PRN **OR** hydrALAZINE (Apresoline) injection 5-10 mg, 5-10 mg, Intravenous, Q20MIN PRN, Jacob Chavez M.D.    NIFEdipine SR (Procadia-XL) tablet 60 mg, 60 mg, Oral, Q DAY, Jacob Chavez M.D., 60 mg at 24 0604    NIFEdipine SR (Procadia-XL) tablet 30 mg, 30 mg, Oral, Once, Jacob Chavez M.D.    Tob/Etoh   Tobacco Use: Medium Risk (2024)    Received from [facility] (and [facility], [place], and [place] prior to 2021)    Patient History     Smoking Tobacco Use: Former     Smokeless Tobacco Use: Never     Passive Exposure: Not on file   ,   Alcohol Use: Not on  file        Family History: Negative for any birth defects, chromosomal abnormalities, or mental retardation in the  paternal or maternal history    Labs:  Admission on 12/13/2024   Component Date Value Ref Range Status    Sodium 12/13/2024 134 (L)  135 - 145 mmol/L Final    Potassium 12/13/2024 4.7  3.6 - 5.5 mmol/L Final    Chloride 12/13/2024 104  96 - 112 mmol/L Final    Co2 12/13/2024 17 (L)  20 - 33 mmol/L Final    Anion Gap 12/13/2024 13.0  7.0 - 16.0 Final    Glucose 12/13/2024 81  65 - 99 mg/dL Final    Bun 12/13/2024 15  8 - 22 mg/dL Final    Creatinine 12/13/2024 0.59  0.50 - 1.40 mg/dL Final    Calcium 12/13/2024 8.6  8.5 - 10.5 mg/dL Final    Correct Calcium 12/13/2024 9.4  8.5 - 10.5 mg/dL Final    AST(SGOT) 12/13/2024 20  12 - 45 U/L Final    ALT(SGPT) 12/13/2024 14  2 - 50 U/L Final    Alkaline Phosphatase 12/13/2024 135 (H)  30 - 99 U/L Final    Total Bilirubin 12/13/2024 <0.2  0.1 - 1.5 mg/dL Final    Albumin 12/13/2024 3.0 (L)  3.2 - 4.9 g/dL Final    Total Protein 12/13/2024 6.1  6.0 - 8.2 g/dL Final    Globulin 12/13/2024 3.1  1.9 - 3.5 g/dL Final    A-G Ratio 12/13/2024 1.0  g/dL Final    WBC 12/13/2024 9.7  4.8 - 10.8 K/uL Final    RBC 12/13/2024 4.14 (L)  4.20 - 5.40 M/uL Final    Hemoglobin 12/13/2024 13.1  12.0 - 16.0 g/dL Final    Hematocrit 12/13/2024 38.6  37.0 - 47.0 % Final    MCV 12/13/2024 93.2  81.4 - 97.8 fL Final    MCH 12/13/2024 31.6  27.0 - 33.0 pg Final    MCHC 12/13/2024 33.9  32.2 - 35.5 g/dL Final    RDW 12/13/2024 43.0  35.9 - 50.0 fL Final    Platelet Count 12/13/2024 271  164 - 446 K/uL Final    MPV 12/13/2024 9.9  9.0 - 12.9 fL Final    Neutrophils-Polys 12/13/2024 71.70  44.00 - 72.00 % Final    Lymphocytes 12/13/2024 19.20 (L)  22.00 - 41.00 % Final    Monocytes 12/13/2024 7.00  0.00 - 13.40 % Final    Eosinophils 12/13/2024 1.10  0.00 - 6.90 % Final    Basophils 12/13/2024 0.40  0.00 - 1.80 % Final    Immature Granulocytes 12/13/2024 0.60  0.00 - 0.90 % Final     Nucleated RBC 12/13/2024 0.00  0.00 - 0.20 /100 WBC Final    Neutrophils (Absolute) 12/13/2024 6.95  1.82 - 7.42 K/uL Final    Includes immature neutrophils, if present.    Lymphs (Absolute) 12/13/2024 1.86  1.00 - 4.80 K/uL Final    Monos (Absolute) 12/13/2024 0.68  0.00 - 0.85 K/uL Final    Eos (Absolute) 12/13/2024 0.11  0.00 - 0.51 K/uL Final    Baso (Absolute) 12/13/2024 0.04  0.00 - 0.12 K/uL Final    Immature Granulocytes (abs) 12/13/2024 0.06  0.00 - 0.11 K/uL Final    NRBC (Absolute) 12/13/2024 0.00  K/uL Final    Total Protein, Urine 12/13/2024 551.0 (H)  0.0 - 15.0 mg/dL Final    Results obtained by dilution.    Creatinine, Random Urine 12/13/2024 60.02  mg/dL Final    Comment: Reference ranges have not been established for this specimen type.  Result interpretation should include consideration of patient's  medical condition and clinical presentation.      Protein Creatinine Ratio 12/13/2024 9180 (H)  10 - 107 mg/g Final    Holding Tube - Bb 12/13/2024 DONE   Final    Holding Tube - Bb 12/13/2024 DONE   Final    GFR (CKD-EPI) 12/13/2024 120  >60 mL/min/1.73 m 2 Final    Comment: Estimated Glomerular Filtration Rate is calculated using  race neutral CKD-EPI 2021 equation per NKF-ASN recommendations.     Admission on 12/08/2024, Discharged on 12/12/2024   Component Date Value Ref Range Status    Total Volume, Urine 12/09/2024 4200 mL  mL Final    Total Protein, Urine 12/09/2024 161.0 (H)  0.0 - 15.0 mg/dL Final    Total Protein, 24 Hour Urine 12/09/2024 6762.0 (H)  30.0 - 150.0 mg/24 Hr Final    WBC 12/08/2024 9.2  4.8 - 10.8 K/uL Final    RBC 12/08/2024 4.12 (L)  4.20 - 5.40 M/uL Final    Hemoglobin 12/08/2024 13.2  12.0 - 16.0 g/dL Final    Hematocrit 12/08/2024 38.0  37.0 - 47.0 % Final    MCV 12/08/2024 92.2  81.4 - 97.8 fL Final    MCH 12/08/2024 32.0  27.0 - 33.0 pg Final    MCHC 12/08/2024 34.7  32.2 - 35.5 g/dL Final    RDW 12/08/2024 43.8  35.9 - 50.0 fL Final    Platelet Count 12/08/2024 265  164  - 446 K/uL Final    MPV 12/08/2024 9.9  9.0 - 12.9 fL Final    Sodium 12/08/2024 136  135 - 145 mmol/L Final    Potassium 12/08/2024 4.1  3.6 - 5.5 mmol/L Final    Chloride 12/08/2024 107  96 - 112 mmol/L Final    Co2 12/08/2024 18 (L)  20 - 33 mmol/L Final    Anion Gap 12/08/2024 11.0  7.0 - 16.0 Final    Glucose 12/08/2024 96  65 - 99 mg/dL Final    Bun 12/08/2024 11  8 - 22 mg/dL Final    Creatinine 12/08/2024 0.81  0.50 - 1.40 mg/dL Final    Calcium 12/08/2024 8.5  8.5 - 10.5 mg/dL Final    Correct Calcium 12/08/2024 9.1  8.5 - 10.5 mg/dL Final    AST(SGOT) 12/08/2024 29  12 - 45 U/L Final    ALT(SGPT) 12/08/2024 13  2 - 50 U/L Final    Alkaline Phosphatase 12/08/2024 141 (H)  30 - 99 U/L Final    Total Bilirubin 12/08/2024 <0.2  0.1 - 1.5 mg/dL Final    Albumin 12/08/2024 3.2  3.2 - 4.9 g/dL Final    Total Protein 12/08/2024 6.4  6.0 - 8.2 g/dL Final    Globulin 12/08/2024 3.2  1.9 - 3.5 g/dL Final    A-G Ratio 12/08/2024 1.0  g/dL Final    Magnesium 12/08/2024 4.3 (H)  1.5 - 2.5 mg/dL Final    Strep Gp B DNA PCR 12/09/2024 Negative  Negative Final    ABO Grouping Only 12/08/2024 O   Final    Rh Grouping Only 12/08/2024 POS   Final    Antibody Screen-Cod 12/08/2024 NEG   Final    GFR (CKD-EPI) 12/08/2024 97  >60 mL/min/1.73 m 2 Final    Comment: Estimated Glomerular Filtration Rate is calculated using  race neutral CKD-EPI 2021 equation per NKF-ASN recommendations.      ABO Rh Confirm 12/08/2024 O POS   Final    Syphilis, Treponemal Qual 12/08/2024 Nonreactive  Non-Reactive Final    Magnesium 12/08/2024 4.8 (H)  1.5 - 2.5 mg/dL Final    Magnesium 12/09/2024 5.5 (H)  1.5 - 2.5 mg/dL Final    Results obtained by dilution.    Magnesium 12/09/2024 6.3 (H)  1.5 - 2.5 mg/dL Final    Results obtained by dilution.    Antinuclear Antibody 12/11/2024 None Detected  None Detected Final    Comment: If suspicion of connective tissue disease is strong and ANDRE EIA is  negative, consider testing for ANDRE by IFA ([id number]).    No  antibodies to Anti-Nuclear Antibodies (ANDRE) detected.  The  Extractable Nuclear Antigen Antibodies (RNP, Loredo, SSA 52, SSA  60, Scleroderma, Hallie-1 and SSB) and Double Stranded DNA (dsDNA)  Antibody, IgG will not be performed.  INTERPRETIVE INFORMATION: Anti-Nuclear Antibodies (ANDRE), IgG by  ALCIRA  Antinuclear Antibodies (ANDRE), IgG by ALCIRA: ANDRE specimens are  screened using enzyme-linked immunosorbent assay (ALCIRA)  methodology. All ALCIRA results reported as Detected are further  tested by indirect fluorescent assay (IFA) using HEp-2 substrate  with an IgG-specific conjugate. The ANDRE ALCIRA screen is designed  to detect antibodies against dsDNA, histones, SS-A (Ro), SS-B  (La), Loredo, Loredo/RNP, Scl-70, Hallie-1, centromeric proteins, other  antigens extracted from the HEp-2 cell nucleus. ANDRE ALCIRA assays  have been reported to have lower sensitivities than ANDRE IFA                            for  systemic autoimmune rheumatic diseases (SARD).  Negative results do not necessarily rule out SARD.  Performed By: [facility]  [address] [place] [id number]  : Rashid Griffith MD, PhD  CLIA Number: [id number]      Anti-Cariolipin Ab Igg 12/11/2024 <10  <=14 GPL Final    Comment: INTERPRETIVE INFORMATION: Anti-Cardiolipin IgG Ab  <=14 GPL: Negative  15-19 GPL: Indeterminate  20-80 GPL: Low to Moderately Positive  81 GPL or above: High Positive  The persistent presence of IgG and/or IgM cardiolipin (CL)  antibodies in moderate or high levels (greater than 40 GPL and/or  greater  than 40 MPL units) is a laboratory criterion for the  diagnosis of antiphospholipid syndrome (APS). Persistence is  defined as moderate or high levels of IgG and/or IgM CL antibodies  detected in two or more specimens drawn at least 12 weeks apart (J  Throm Haemost. 2006;4:295-306). Lower positive levels of IgG  and/or IgM CL antibodies (above cutoff but less than 40 GPL and/or  less than 40 MPL units) may occur in  patients with the clinical  symptoms of APS; therefore, the actual significance of these  levels is undefined. Results should not be used alone for  diagnosis and must be interpreted in light of APS-specific  clinical manifestations and/or other criteria phospholipid  antibody                            tests.      Anti-Cardiolipin Ab Igm 12/11/2024 <10  <=12 MPL Final    Comment: INTERPRETIVE INFORMATION: Anti-Cardiolipin IgM  <=12 MPL: Negative  13-19 MPL: Indeterminate  20-80 MPL: Low to Moderately Positive  81 MPL or above: High Positive  The persistent presence of IgG and/or IgM cardiolipin (CL)  antibodies in moderate or high levels (greater than 40 GPL and/or  greater than 40 MPL units) is a laboratory criterion for the  diagnosis of antiphospholipid syndrome (APS). Persistence is  defined as moderate or high levels of IgG and/or IgM CL antibodies  detected  in two or more specimens drawn at least 12 weeks apart  (J Throm Haemost. 2006;4:295-306). Lower positive levels of IgG  and/or IgM CL antibodies (above cutoff but less than 40 GPL and/or  less than 40 MPL units) may occur in patients with the clinical  symptoms of APS; therefore, the actual significance of these  levels is undefined. Results should not be used alone for  diagnosis and must be interpreted in light of APS-specific  clinical manifestations and/or other criteria phospholipid  antibody te                           sts.      Anti-Cardiolipin Ab Iga 12/11/2024 <10  <=11 APL Final    Comment: INTERPRETIVE INFORMATION: Cardiolipin Antibodies, IgA  <=11 APL: Negative  12-19 APL: Indeterminate  20-80 APL: Low to Moderately  Positive  81 APL or above: High Positive  Performed By: [facility]  [address] [place] [id number]  : Rashid Griffith MD, PhD  CLIA Number: [id number]      Sodium 12/11/2024 133 (L)  135 - 145 mmol/L Final    Potassium 12/11/2024 4.5  3.6 - 5.5 mmol/L Final    Chloride 12/11/2024 101  96  - 112 mmol/L Final    Co2 12/11/2024 20  20 - 33 mmol/L Final    Anion Gap 12/11/2024 12.0  7.0 - 16.0 Final    Glucose 12/11/2024 87  65 - 99 mg/dL Final    Bun 12/11/2024 16  8 - 22 mg/dL Final    Creatinine 12/11/2024 0.83  0.50 - 1.40 mg/dL Final    Calcium 12/11/2024 8.4 (L)  8.5 - 10.5 mg/dL Final    Correct Calcium 12/11/2024 9.1  8.5 - 10.5 mg/dL Final    AST(SGOT) 12/11/2024 24  12 - 45 U/L Final    ALT(SGPT) 12/11/2024 17  2 - 50 U/L Final    Alkaline Phosphatase 12/11/2024 138 (H)  30 - 99 U/L Final    Total Bilirubin 12/11/2024 <0.2  0.1 - 1.5 mg/dL Final    Albumin 12/11/2024 3.1 (L)  3.2 - 4.9 g/dL Final    Total Protein 12/11/2024 6.3  6.0 - 8.2 g/dL Final    Globulin 12/11/2024 3.2  1.9 - 3.5 g/dL Final    A-G Ratio 12/11/2024 1.0  g/dL Final    WBC 12/11/2024 11.6 (H)  4.8 - 10.8 K/uL Final    RBC 12/11/2024 4.07 (L)  4.20 - 5.40 M/uL Final    Hemoglobin 12/11/2024 13.0  12.0 - 16.0 g/dL Final    Hematocrit 12/11/2024 39.5  37.0 - 47.0 % Final    MCV 12/11/2024 97.1  81.4 - 97.8 fL Final    MCH 12/11/2024 31.9  27.0 - 33.0 pg Final    MCHC 12/11/2024 32.9  32.2 - 35.5 g/dL Final    RDW 12/11/2024 46.5  35.9 - 50.0 fL Final    Platelet Count 12/11/2024 280  164 - 446 K/uL Final    MPV 12/11/2024 10.1  9.0 - 12.9 fL Final    Neutrophils-Polys 12/11/2024 74.30 (H)  44.00 - 72.00 % Final    Lymphocytes 12/11/2024 18.50 (L)  22.00 - 41.00 % Final    Monocytes 12/11/2024 5.50  0.00 - 13.40 % Final    Eosinophils 12/11/2024 0.80  0.00 - 6.90 % Final    Basophils 12/11/2024 0.30  0.00 - 1.80 % Final    Immature Granulocytes 12/11/2024 0.60  0.00 - 0.90 % Final    Nucleated RBC 12/11/2024 0.20  0.00 - 0.20 /100 WBC Final    Neutrophils (Absolute) 12/11/2024 8.62 (H)  1.82 - 7.42 K/uL Final    Includes immature neutrophils, if present.    Lymphs (Absolute) 12/11/2024 2.14  1.00 - 4.80 K/uL Final    Monos (Absolute) 12/11/2024 0.64  0.00 - 0.85 K/uL Final    Eos (Absolute) 12/11/2024 0.09  0.00 - 0.51 K/uL  Final    Baso (Absolute) 12/11/2024 0.03  0.00 - 0.12 K/uL Final    Immature Granulocytes (abs) 12/11/2024 0.07  0.00 - 0.11 K/uL Final    NRBC (Absolute) 12/11/2024 0.02  K/uL Final    GFR (CKD-EPI) 12/11/2024 94  >60 mL/min/1.73 m 2 Final    Comment: Estimated Glomerular Filtration Rate is calculated using  race neutral CKD-EPI 2021 equation per NKF-ASN recommendations.         Sonographic evaluation: See report    Impression:  1. 34w1d Intrauterine Pregnancy  Patient was made aware that the growth is consistent with dates and noabnormalities were noted.    2. Preeclampsia  No signs of severe features at this time. She was previously counseled.     3.  IUGR  Will get Dopplers this week. The NST is reassuring    Recommendations:  1. Continue monitoring her BP and deliver for signs of severe features    2. Will get dopplers repeated on Monday    The findings and recommendations were reviewed with the patient. Her questions were answered, and she expressed understanding of the information given. She agreed with the recommendations and plan of care as delineated above. Time spent face-to-face was 35 minutes. This included reviewing her prenatal records and hospital chart along with the time spent counseling the patient regarding the findings from today's assessment and plan of care.     Thank you for once again for your referral.    Gabe Platt M.D.

## 2024-12-14 NOTE — PROGRESS NOTES
Late entry due to pt care:   EDC -25 EGA -34 weeks      2205- Pt arrived to labor and delivery for Elevated Bps at home. Pt placed in room LDA 1.   2210- External monitors in place X2. Pt reports good FM. No complaints of contractions, ROM or vaginal bleeding. FOB at bedside. POC discussed with pt and family members, all questions answered.    2229- Report to Dr. Chavez. Orders received for hypertensive protocol. Admission orders received.     2320- Report to Sheryl GOLDBERG. Care Relinquished.

## 2024-12-14 NOTE — H&P
history and Physical      Sasha Bryant is a 34 y.o. year old female  at 34w0d who presents with increased blood pressures and presents for evaluation.  The patient was initially admitted for gestational hypertension with superimposed preeclampsia .  She was discharged home yesterday  after a prolonged hospitalization for magnesium therapy and betamethasone injections.  She was discharged home on nifedipine XL 30 mg after stable blood pressures. This evening she reports increased headaches.  She denies abdominal tenderness, vaginal bleeding and reports good fetal movement.  She was noted to have blood pressures in the 150s to 60s over 90s range.  Her last set of labs were generally normal with exception of significant proteinuria.  ANDRE and anticardiolipin antibodies were within normal range.      ROS: Pertinent items are noted in HPI.    No past medical history on file.  No past surgical history on file.  OB History    Para Term  AB Living   2 1 1     1   SAB IAB Ectopic Molar Multiple Live Births                    # Outcome Date GA Lbr Joss/2nd Weight Sex Type Anes PTL Lv   2 Current            1 Term     M CS-LTranv  N       Complications: Intraamniotic Infection, Failure to Progress in Second Stage     Social History     Socioeconomic History    Marital status:      Spouse name: Not on file    Number of children: Not on file    Years of education: Not on file    Highest education level: Not on file   Occupational History    Not on file   Tobacco Use    Smoking status: Not on file    Smokeless tobacco: Not on file   Substance and Sexual Activity    Alcohol use: Not on file    Drug use: Not on file    Sexual activity: Not on file   Other Topics Concern    Not on file   Social History Narrative    Not on file     Social Drivers of Health     Financial Resource Strain: Not on file   Food Insecurity: No Food Insecurity (2024)    Hunger Vital Sign      "Worried About Running Out of Food in the Last Year: Never true     Ran Out of Food in the Last Year: Never true   Transportation Needs: No Transportation Needs (2024)    PRAPARE - Transportation     Lack of Transportation (Medical): No     Lack of Transportation (Non-Medical): No   Physical Activity: Not on file   Stress: Not on file   Social Connections: Not on file   Intimate Partner Violence: Not At Risk (2024)    Humiliation, Afraid, Rape, and Kick questionnaire     Fear of Current or Ex-Partner: No     Emotionally Abused: No     Physically Abused: No     Sexually Abused: No   Housing Stability: Unknown (2024)    Housing Stability Vital Sign     Unable to Pay for Housing in the Last Year: No     Number of Times Moved in the Last Year: Not on file     Homeless in the Last Year: No     Allergies: Copper    Current Facility-Administered Medications:     NIFEdipine IR (Procardia) capsule 10 mg, 10 mg, Oral, Q20MIN PRN, 10 mg at 24 **OR** labetalol (Normodyne/Trandate) injection 20-80 mg, 20-80 mg, Intravenous, Q10 MIN PRN **OR** hydrALAZINE (Apresoline) injection 5-10 mg, 5-10 mg, Intravenous, Q20MIN PRN, Jacob Chavez M.D.      Patient Active Problem List    Diagnosis Date Noted    History of postpartum hemorrhage 2024    History of  delivery 2024    History of sepsis 2024    History of loop electrical excision procedure (LEEP) 2024    Gestational hypertension, third trimester 12/10/2024    Fetal growth restriction antepartum 12/10/2024    Indication for care in labor or delivery 2024               Objective:      BP (!) 162/89   Pulse 79   Temp 36.8 °C (98.3 °F) (Temporal)   Resp 18   Ht 1.6 m (5' 3\")   Wt 85.3 kg (188 lb)   SpO2 96%   GENERAL: Alert, in no apparent distress  PSYCHIATRIC: Appropriate affect, intact insight and judgement.  HEENT: PERRLA, EOMI, no scleral icterus  RESPIRATORY: Normal respiratory effort.  Lungs clear to " auscultation.   CARDIOVASCULAR: RRR  ABDOMEN: Soft, gravid, nontender.  EXTREMITIES: No edema, calves NT  SKIN: No rash    SVE: Deferred for now    Fetal heart tones-baseline 130 beats minute with moderate variability and accelerations, no significant decelerations.  The patient does have intermittent uterine contractions that are not noticed by the patient.        Lab:   Blood type: O     Recent Results (from the past 35 weeks)   HIV-1/HIV-2 AB DIFFERENTIATION    Collection Time: 06/28/24  8:28 AM   Result Value Ref Range    HIV 1+2 AB External Non-reactive Non-reactive   HEP C VIRUS ANTIBODY    Collection Time: 06/28/24  8:28 AM   Result Value Ref Range    Hepatitis C virus Ab (Presence) in Serum External NON-REACTIVE Non-reactive   HEP B SURFACE AB    Collection Time: 06/28/24  8:28 AM   Result Value Ref Range    Hepatitis B virus surface Ag (Presence) in Serum External NON-REACTIVE Non-reactive   CBC WITHOUT DIFFERENTIAL    Collection Time: 06/28/24  8:28 AM   Result Value Ref Range    WBC 8.0 4.0 - 10.0 K/uL    RBC 4.30 3.70 - 5.30 M/uL    Hemoglobin 13.1 12.0 - 16.0 g/dL    Hematocrit 39.1 36.0 - 47.0 %    MCV 90.9 81.0 - 100.0 fL    MCH 30.5 26.0 - 34.0 pg    MCHC 33.5 33.0 - 37.0 g/dL    Platelet Count 332 130 - 400 K/uL    MPV 10.0 8.5 - 12.5 fL    RDW 12.9 11.5 - 14.5 %   CBC WITHOUT DIFFERENTIAL    Collection Time: 10/23/24  9:23 AM   Result Value Ref Range    WBC 9.1 4.0 - 10.0 K/uL    RBC 4.13 3.70 - 5.30 M/uL    Hemoglobin 12.9 12.0 - 16.0 g/dL    Hematocrit 38.3 36.0 - 47.0 %    MCV 92.7 81.0 - 100.0 fL    MCH 31.2 26.0 - 34.0 pg    MCHC 33.7 33.0 - 37.0 g/dL    Platelet Count 252 130 - 400 K/uL    MPV 10.4 8.5 - 12.5 fL    RDW 13.2 11.5 - 14.5 %   CBC WITH DIFFERENTIAL    Collection Time: 12/08/24  1:11 PM   Result Value Ref Range    WBC 9.7 4.0 - 10.0 K/uL    RBC 3.96 3.70 - 5.30 M/uL    Hemoglobin 12.6 12.0 - 16.0 g/dL    Hematocrit 35.8 (L) 36.0 - 47.0 %    MCV 90.4 81.0 - 100.0 fL    MCH 31.8  26.0 - 34.0 pg    MCHC 35.2 33.0 - 37.0 g/dL    RDW 12.9 11.5 - 14.5 %    Platelet Count 226 130 - 400 K/uL    MPV 10.3 8.5 - 12.5 fL    Neutrophils-Polys 71 42 - 75 %    Lymphocytes 22 21 - 51 %    Monocytes 6 2 - 12 %    Eosinophils 1 0 - 7 %    Basophils 0 0 - 2 %    Immature Granulocytes 0 0 - 1 %    Neutrophils (Absolute) 6.81 1.50 - 8.00 K/uL    Lymphs (Absolute) 2.14 0.70 - 4.50 K/uL    Monos (Absolute) 0.58 0.10 - 1.00 K/uL    Eos (Absolute) 0.07 0.00 - 0.40 K/uL    Baso (Absolute) 0.04 0.00 - 0.20 K/uL    Immature Granulocytes (abs) 0.02 0.00 - 2.90 K/uL   CBC WITHOUT DIFFERENTIAL    Collection Time: 12/08/24  5:24 PM   Result Value Ref Range    WBC 9.2 4.8 - 10.8 K/uL    RBC 4.12 (L) 4.20 - 5.40 M/uL    Hemoglobin 13.2 12.0 - 16.0 g/dL    Hematocrit 38.0 37.0 - 47.0 %    MCV 92.2 81.4 - 97.8 fL    MCH 32.0 27.0 - 33.0 pg    MCHC 34.7 32.2 - 35.5 g/dL    RDW 43.8 35.9 - 50.0 fL    Platelet Count 265 164 - 446 K/uL    MPV 9.9 9.0 - 12.9 fL   COMP METABOLIC PANEL    Collection Time: 12/08/24  5:24 PM   Result Value Ref Range    Sodium 136 135 - 145 mmol/L    Potassium 4.1 3.6 - 5.5 mmol/L    Chloride 107 96 - 112 mmol/L    Co2 18 (L) 20 - 33 mmol/L    Anion Gap 11.0 7.0 - 16.0    Glucose 96 65 - 99 mg/dL    Bun 11 8 - 22 mg/dL    Creatinine 0.81 0.50 - 1.40 mg/dL    Calcium 8.5 8.5 - 10.5 mg/dL    Correct Calcium 9.1 8.5 - 10.5 mg/dL    AST(SGOT) 29 12 - 45 U/L    ALT(SGPT) 13 2 - 50 U/L    Alkaline Phosphatase 141 (H) 30 - 99 U/L    Total Bilirubin <0.2 0.1 - 1.5 mg/dL    Albumin 3.2 3.2 - 4.9 g/dL    Total Protein 6.4 6.0 - 8.2 g/dL    Globulin 3.2 1.9 - 3.5 g/dL    A-G Ratio 1.0 g/dL   SERUM MAGNESIUM NOW    Collection Time: 12/08/24  5:24 PM   Result Value Ref Range    Magnesium 4.3 (H) 1.5 - 2.5 mg/dL   ESTIMATED GFR    Collection Time: 12/08/24  5:24 PM   Result Value Ref Range    GFR (CKD-EPI) 97 >60 mL/min/1.73 m 2   ABO Rh Confirm    Collection Time: 12/08/24  5:24 PM   Result Value Ref Range    ABO  Rh Confirm O POS    COD - Adult (Type and Screen)    Collection Time: 12/08/24  6:26 PM   Result Value Ref Range    ABO Grouping Only O     Rh Grouping Only POS     Antibody Screen-Cod NEG    T.PALLIDUM AB GABBY (SCREENING)    Collection Time: 12/08/24  6:26 PM   Result Value Ref Range    Syphilis, Treponemal Qual Nonreactive Non-Reactive   SERUM MAGNESIUM LEVELS EVERY 6 HRS UNTIL DESIRED RANGE (5-8 MG/DL) ACHIEVED    Collection Time: 12/08/24 11:04 PM   Result Value Ref Range    Magnesium 4.8 (H) 1.5 - 2.5 mg/dL   SERUM MAGNESIUM LEVELS EVERY 6 HRS UNTIL DESIRED RANGE (5-8 MG/DL) ACHIEVED    Collection Time: 12/09/24  5:05 AM   Result Value Ref Range    Magnesium 5.5 (H) 1.5 - 2.5 mg/dL   SERUM MAGNESIUM LEVELS EVERY 6 HRS UNTIL DESIRED RANGE (5-8 MG/DL) ACHIEVED    Collection Time: 12/09/24 11:01 AM   Result Value Ref Range    Magnesium 6.3 (H) 1.5 - 2.5 mg/dL   GRP B STREP, BY PCR (ANDERSON BROTH)    Collection Time: 12/09/24  1:23 PM    Specimen: Other; Genital   Result Value Ref Range    Strep Gp B DNA PCR Negative Negative   URINE TOTAL PROTEIN 24 HR    Collection Time: 12/09/24  5:30 PM   Result Value Ref Range    Total Volume, Urine 4200 mL mL    Total Protein, Urine 161.0 (H) 0.0 - 15.0 mg/dL    Total Protein, 24 Hour Urine 6762.0 (H) 30.0 - 150.0 mg/24 Hr   ANDRE REFLEXIVE PROFILE    Collection Time: 12/11/24 12:29 PM   Result Value Ref Range    Antinuclear Antibody None Detected None Detected   ANTICARDIOLIPIN AB IGG,IGM,IGA    Collection Time: 12/11/24 12:29 PM   Result Value Ref Range    Anti-Cariolipin Ab Igg <10 <=14 GPL    Anti-Cardiolipin Ab Igm <10 <=12 MPL    Anti-Cardiolipin Ab Iga <10 <=11 APL   Comp Metabolic Panel    Collection Time: 12/11/24 12:29 PM   Result Value Ref Range    Sodium 133 (L) 135 - 145 mmol/L    Potassium 4.5 3.6 - 5.5 mmol/L    Chloride 101 96 - 112 mmol/L    Co2 20 20 - 33 mmol/L    Anion Gap 12.0 7.0 - 16.0    Glucose 87 65 - 99 mg/dL    Bun 16 8 - 22 mg/dL    Creatinine 0.83 0.50  - 1.40 mg/dL    Calcium 8.4 (L) 8.5 - 10.5 mg/dL    Correct Calcium 9.1 8.5 - 10.5 mg/dL    AST(SGOT) 24 12 - 45 U/L    ALT(SGPT) 17 2 - 50 U/L    Alkaline Phosphatase 138 (H) 30 - 99 U/L    Total Bilirubin <0.2 0.1 - 1.5 mg/dL    Albumin 3.1 (L) 3.2 - 4.9 g/dL    Total Protein 6.3 6.0 - 8.2 g/dL    Globulin 3.2 1.9 - 3.5 g/dL    A-G Ratio 1.0 g/dL   CBC WITH DIFFERENTIAL    Collection Time: 12/11/24 12:29 PM   Result Value Ref Range    WBC 11.6 (H) 4.8 - 10.8 K/uL    RBC 4.07 (L) 4.20 - 5.40 M/uL    Hemoglobin 13.0 12.0 - 16.0 g/dL    Hematocrit 39.5 37.0 - 47.0 %    MCV 97.1 81.4 - 97.8 fL    MCH 31.9 27.0 - 33.0 pg    MCHC 32.9 32.2 - 35.5 g/dL    RDW 46.5 35.9 - 50.0 fL    Platelet Count 280 164 - 446 K/uL    MPV 10.1 9.0 - 12.9 fL    Neutrophils-Polys 74.30 (H) 44.00 - 72.00 %    Lymphocytes 18.50 (L) 22.00 - 41.00 %    Monocytes 5.50 0.00 - 13.40 %    Eosinophils 0.80 0.00 - 6.90 %    Basophils 0.30 0.00 - 1.80 %    Immature Granulocytes 0.60 0.00 - 0.90 %    Nucleated RBC 0.20 0.00 - 0.20 /100 WBC    Neutrophils (Absolute) 8.62 (H) 1.82 - 7.42 K/uL    Lymphs (Absolute) 2.14 1.00 - 4.80 K/uL    Monos (Absolute) 0.64 0.00 - 0.85 K/uL    Eos (Absolute) 0.09 0.00 - 0.51 K/uL    Baso (Absolute) 0.03 0.00 - 0.12 K/uL    Immature Granulocytes (abs) 0.07 0.00 - 0.11 K/uL    NRBC (Absolute) 0.02 K/uL   ESTIMATED GFR    Collection Time: 12/11/24 12:29 PM   Result Value Ref Range    GFR (CKD-EPI) 94 >60 mL/min/1.73 m 2        Assessment:   Sasha Bryant at 34w0d who presents with increasing elevated blood pressures and headache.  Patient will be admitted for observation for worsening hypertension.  Preeclampsia labs were obtained and if she has evidence of liver function abnormalities or other findings for preeclampsia, she would be started on magnesium therapy again.  She has completed her course of betamethasone.  We will titrate her nifedipine as needed to obtain normotensive blood pressures.     Plan:     Admit to  L&D for observation and management of her blood pressure.

## 2024-12-14 NOTE — PROGRESS NOTES
0045: Received report from ASHLYN Saucedo.     0100: Pt sleeping at this time.    0141: Orders clarified by Dr. Chavez.     0700: Report given to ASHLYN Torres.

## 2024-12-14 NOTE — PROGRESS NOTES
0700- report received from Angeles GOLDBERG. Plan of care discussed.   0815- assessment done. Patient denies any contractions, LOF or VB. States positive fetal movement. Patient denies any HA now, states she was admitted with one, but it has resolved. Patient denies any epigastric pain. Dr Platt in to see patient. Discussed the plan of care with patient and . Questions answered.   1045- reactive NST obtained. EFM and TOCO off.   1225- patient states she has a headache. Denies any visual changes. Dr Platt updated. Order received for Tylenol.  1900- report given to Meka GOLDBERG

## 2024-12-15 PROCEDURE — 700102 HCHG RX REV CODE 250 W/ 637 OVERRIDE(OP): Performed by: OBSTETRICS & GYNECOLOGY

## 2024-12-15 PROCEDURE — A9270 NON-COVERED ITEM OR SERVICE: HCPCS | Performed by: OBSTETRICS & GYNECOLOGY

## 2024-12-15 PROCEDURE — 303615 HCHG EPIDURAL/SPINAL ANESTHESIA FOR LABOR

## 2024-12-15 PROCEDURE — A9270 NON-COVERED ITEM OR SERVICE: HCPCS | Performed by: STUDENT IN AN ORGANIZED HEALTH CARE EDUCATION/TRAINING PROGRAM

## 2024-12-15 PROCEDURE — 770002 HCHG ROOM/CARE - OB PRIVATE (112)

## 2024-12-15 PROCEDURE — 700102 HCHG RX REV CODE 250 W/ 637 OVERRIDE(OP): Performed by: STUDENT IN AN ORGANIZED HEALTH CARE EDUCATION/TRAINING PROGRAM

## 2024-12-15 PROCEDURE — 99231 SBSQ HOSP IP/OBS SF/LOW 25: CPT | Performed by: OBSTETRICS & GYNECOLOGY

## 2024-12-15 PROCEDURE — 59025 FETAL NON-STRESS TEST: CPT

## 2024-12-15 PROCEDURE — 302790 HCHG STAT ANTEPARTUM CARE, DAILY

## 2024-12-15 RX ORDER — CALCIUM CARBONATE 500 MG/1
1000 TABLET, CHEWABLE ORAL ONCE
Status: COMPLETED | OUTPATIENT
Start: 2024-12-15 | End: 2024-12-15

## 2024-12-15 RX ADMIN — ANTACID TABLETS 1000 MG: 500 TABLET, CHEWABLE ORAL at 00:13

## 2024-12-15 RX ADMIN — NIFEDIPINE 60 MG: 30 TABLET, FILM COATED, EXTENDED RELEASE ORAL at 06:08

## 2024-12-15 RX ADMIN — ANTACID TABLETS 1000 MG: 500 TABLET, CHEWABLE ORAL at 19:42

## 2024-12-15 RX ADMIN — PRENATAL WITH FERROUS FUM AND FOLIC ACID 1 TABLET: 3080; 920; 120; 400; 22; 1.84; 3; 20; 10; 1; 12; 200; 27; 25; 2 TABLET ORAL at 08:43

## 2024-12-15 ASSESSMENT — PATIENT HEALTH QUESTIONNAIRE - PHQ9
2. FEELING DOWN, DEPRESSED, IRRITABLE, OR HOPELESS: NOT AT ALL
1. LITTLE INTEREST OR PLEASURE IN DOING THINGS: NOT AT ALL
SUM OF ALL RESPONSES TO PHQ9 QUESTIONS 1 AND 2: 0

## 2024-12-15 NOTE — CARE PLAN
The patient is Stable - Low risk of patient condition declining or worsening    Shift Goals  Clinical Goals: monitor VS and FHTs  Patient Goals: rest  Family Goals: support    Progress made toward(s) clinical / shift goals:    Problem: Psychosocial - L&D  Goal: Patient's level of anxiety will decrease  Outcome: Progressing  Pt educated regarding POC, questions answered. Pt encouraged to call RN with nay needs.     Problem: Risk for Infection and Impaired Wound Healing  Goal: Patient will remain free from infection  Outcome: Progressing      Patient is not progressing towards the following goals:

## 2024-12-15 NOTE — PROGRESS NOTES
"ANTEPARTUM PROGRESS NOTE;    Sasha Bryant is a 34 y.o. female  at 34w2d.  Admitted for preeclampsia w/o severe features    Doing well, no complaints. On Nifedipine with good control of her BP's  Good fetal movement    Review of systems; denies vaginal bleeding, leakage of fluid, uterine contractions, fever chills or abdominal pain    Physical examination;  Alert and oriented x3  Gen.-well-developed well-nourished female in no apparent distress  HEENT-normocephalic, nontraumatic,EOMI,PERRLA  BP (!) 142/83   Pulse 100   Temp 36.4 °C (97.6 °F) (Temporal)   Resp 16   Ht 1.6 m (5' 3\")   Wt 81.6 kg (179 lb 14.3 oz)   SpO2 95%   BMI 31.87 kg/m²   Abdomen-Soft, gravid, nontender  Extremities without cyanosis clubbing or edema  Neurologic grossly intact    Labs:  Admission on 2024   Component Date Value Ref Range Status    Sodium 2024 134 (L)  135 - 145 mmol/L Final    Potassium 2024 4.7  3.6 - 5.5 mmol/L Final    Chloride 2024 104  96 - 112 mmol/L Final    Co2 2024 17 (L)  20 - 33 mmol/L Final    Anion Gap 2024 13.0  7.0 - 16.0 Final    Glucose 2024 81  65 - 99 mg/dL Final    Bun 2024 15  8 - 22 mg/dL Final    Creatinine 2024 0.59  0.50 - 1.40 mg/dL Final    Calcium 2024 8.6  8.5 - 10.5 mg/dL Final    Correct Calcium 2024 9.4  8.5 - 10.5 mg/dL Final    AST(SGOT) 2024 20  12 - 45 U/L Final    ALT(SGPT) 2024 14  2 - 50 U/L Final    Alkaline Phosphatase 2024 135 (H)  30 - 99 U/L Final    Total Bilirubin 2024 <0.2  0.1 - 1.5 mg/dL Final    Albumin 2024 3.0 (L)  3.2 - 4.9 g/dL Final    Total Protein 2024 6.1  6.0 - 8.2 g/dL Final    Globulin 2024 3.1  1.9 - 3.5 g/dL Final    A-G Ratio 2024 1.0  g/dL Final    WBC 2024 9.7  4.8 - 10.8 K/uL Final    RBC 2024 4.14 (L)  4.20 - 5.40 M/uL Final    Hemoglobin 2024 13.1  12.0 - 16.0 g/dL Final    Hematocrit 2024 38.6  37.0 - 47.0 % " Final    MCV 12/13/2024 93.2  81.4 - 97.8 fL Final    MCH 12/13/2024 31.6  27.0 - 33.0 pg Final    MCHC 12/13/2024 33.9  32.2 - 35.5 g/dL Final    RDW 12/13/2024 43.0  35.9 - 50.0 fL Final    Platelet Count 12/13/2024 271  164 - 446 K/uL Final    MPV 12/13/2024 9.9  9.0 - 12.9 fL Final    Neutrophils-Polys 12/13/2024 71.70  44.00 - 72.00 % Final    Lymphocytes 12/13/2024 19.20 (L)  22.00 - 41.00 % Final    Monocytes 12/13/2024 7.00  0.00 - 13.40 % Final    Eosinophils 12/13/2024 1.10  0.00 - 6.90 % Final    Basophils 12/13/2024 0.40  0.00 - 1.80 % Final    Immature Granulocytes 12/13/2024 0.60  0.00 - 0.90 % Final    Nucleated RBC 12/13/2024 0.00  0.00 - 0.20 /100 WBC Final    Neutrophils (Absolute) 12/13/2024 6.95  1.82 - 7.42 K/uL Final    Includes immature neutrophils, if present.    Lymphs (Absolute) 12/13/2024 1.86  1.00 - 4.80 K/uL Final    Monos (Absolute) 12/13/2024 0.68  0.00 - 0.85 K/uL Final    Eos (Absolute) 12/13/2024 0.11  0.00 - 0.51 K/uL Final    Baso (Absolute) 12/13/2024 0.04  0.00 - 0.12 K/uL Final    Immature Granulocytes (abs) 12/13/2024 0.06  0.00 - 0.11 K/uL Final    NRBC (Absolute) 12/13/2024 0.00  K/uL Final    Total Protein, Urine 12/13/2024 551.0 (H)  0.0 - 15.0 mg/dL Final    Results obtained by dilution.    Creatinine, Random Urine 12/13/2024 60.02  mg/dL Final    Comment: Reference ranges have not been established for this specimen type.  Result interpretation should include consideration of patient's  medical condition and clinical presentation.      Protein Creatinine Ratio 12/13/2024 9180 (H)  10 - 107 mg/g Final    Holding Tube - Bb 12/13/2024 DONE   Final    Holding Tube - Bb 12/13/2024 DONE   Final    GFR (CKD-EPI) 12/13/2024 120  >60 mL/min/1.73 m 2 Final    Comment: Estimated Glomerular Filtration Rate is calculated using  race neutral CKD-EPI 2021 equation per NKF-ASN recommendations.     Admission on 12/08/2024, Discharged on 12/12/2024   Component Date Value Ref Range Status     Total Volume, Urine 12/09/2024 4200 mL  mL Final    Total Protein, Urine 12/09/2024 161.0 (H)  0.0 - 15.0 mg/dL Final    Total Protein, 24 Hour Urine 12/09/2024 6762.0 (H)  30.0 - 150.0 mg/24 Hr Final    WBC 12/08/2024 9.2  4.8 - 10.8 K/uL Final    RBC 12/08/2024 4.12 (L)  4.20 - 5.40 M/uL Final    Hemoglobin 12/08/2024 13.2  12.0 - 16.0 g/dL Final    Hematocrit 12/08/2024 38.0  37.0 - 47.0 % Final    MCV 12/08/2024 92.2  81.4 - 97.8 fL Final    MCH 12/08/2024 32.0  27.0 - 33.0 pg Final    MCHC 12/08/2024 34.7  32.2 - 35.5 g/dL Final    RDW 12/08/2024 43.8  35.9 - 50.0 fL Final    Platelet Count 12/08/2024 265  164 - 446 K/uL Final    MPV 12/08/2024 9.9  9.0 - 12.9 fL Final    Sodium 12/08/2024 136  135 - 145 mmol/L Final    Potassium 12/08/2024 4.1  3.6 - 5.5 mmol/L Final    Chloride 12/08/2024 107  96 - 112 mmol/L Final    Co2 12/08/2024 18 (L)  20 - 33 mmol/L Final    Anion Gap 12/08/2024 11.0  7.0 - 16.0 Final    Glucose 12/08/2024 96  65 - 99 mg/dL Final    Bun 12/08/2024 11  8 - 22 mg/dL Final    Creatinine 12/08/2024 0.81  0.50 - 1.40 mg/dL Final    Calcium 12/08/2024 8.5  8.5 - 10.5 mg/dL Final    Correct Calcium 12/08/2024 9.1  8.5 - 10.5 mg/dL Final    AST(SGOT) 12/08/2024 29  12 - 45 U/L Final    ALT(SGPT) 12/08/2024 13  2 - 50 U/L Final    Alkaline Phosphatase 12/08/2024 141 (H)  30 - 99 U/L Final    Total Bilirubin 12/08/2024 <0.2  0.1 - 1.5 mg/dL Final    Albumin 12/08/2024 3.2  3.2 - 4.9 g/dL Final    Total Protein 12/08/2024 6.4  6.0 - 8.2 g/dL Final    Globulin 12/08/2024 3.2  1.9 - 3.5 g/dL Final    A-G Ratio 12/08/2024 1.0  g/dL Final    Magnesium 12/08/2024 4.3 (H)  1.5 - 2.5 mg/dL Final    Strep Gp B DNA PCR 12/09/2024 Negative  Negative Final    ABO Grouping Only 12/08/2024 O   Final    Rh Grouping Only 12/08/2024 POS   Final    Antibody Screen-Cod 12/08/2024 NEG   Final    GFR (CKD-EPI) 12/08/2024 97  >60 mL/min/1.73 m 2 Final    Comment: Estimated Glomerular Filtration Rate is calculated  using  race neutral CKD-EPI 2021 equation per NKF-ASN recommendations.      ABO Rh Confirm 12/08/2024 O POS   Final    Syphilis, Treponemal Qual 12/08/2024 Nonreactive  Non-Reactive Final    Magnesium 12/08/2024 4.8 (H)  1.5 - 2.5 mg/dL Final    Magnesium 12/09/2024 5.5 (H)  1.5 - 2.5 mg/dL Final    Results obtained by dilution.    Magnesium 12/09/2024 6.3 (H)  1.5 - 2.5 mg/dL Final    Results obtained by dilution.    Antinuclear Antibody 12/11/2024 None Detected  None Detected Final    Comment: If suspicion of connective tissue disease is strong and ANDRE EIA is  negative, consider testing for ANDRE by IFA ([id number]).    No antibodies to Anti-Nuclear Antibodies (ANDRE) detected.  The  Extractable Nuclear Antigen Antibodies (RNP, Loredo, SSA 52, SSA  60, Scleroderma, Hallie-1 and SSB) and Double Stranded DNA (dsDNA)  Antibody, IgG will not be performed.  INTERPRETIVE INFORMATION: Anti-Nuclear Antibodies (ANDRE), IgG by  ALCIRA  Antinuclear Antibodies (ANDRE), IgG by ALCIRA: ANDRE specimens are  screened using enzyme-linked immunosorbent assay (ALCIRA)  methodology. All ALCIRA results reported as Detected are further  tested by indirect fluorescent assay (IFA) using HEp-2 substrate  with an IgG-specific conjugate. The ANDRE ALCIRA screen is designed  to detect antibodies against dsDNA, histones, SS-A (Ro), SS-B  (La), Loredo, Loredo/RNP, Scl-70, Hallie-1, centromeric proteins, other  antigens extracted from the HEp-2 cell nucleus. ANDRE ALCIRA assays  have been reported to have lower sensitivities than ANDRE IFA                            for  systemic autoimmune rheumatic diseases (SARD).  Negative results do not necessarily rule out SARD.  Performed By: [facility]  [address] [place] [id number]  : Rashid Griffith MD, PhD  CLIA Number: [id number]      PT 12/11/2024 12.0  12.0 - 15.5 s Final    dPT Confirm Ratio 12/11/2024 0.84  <=1.20 Final    dRVVT Screen Ratio 12/11/2024 0.76  <=1.20 Final    Anti-Xa  Qualitative Interpretation 12/11/2024 Not Performed  Not Present Final    Thrombin Time 12/11/2024 Not Performed  <=19.5 s Final    Anticoagulant Medication Neutraliz* 12/11/2024 Not Performed  Not Performed Final    PTT LA 12/11/2024 Not Performed  <=1.20 Final    Neutralized dRVVT Screen Ratio 12/11/2024 Not Performed  <=1.20 Final    dRVVT 1:1 Mix 12/11/2024 Not Performed  <=1.20 Final    dRVVT Confirmation Ratio 12/11/2024 Not Performed  <=1.20 Final    Hexagonal Phospholipid Confirmation 12/11/2024 Not Performed  <=7.9 s Final    Lupus Anticoag Interp 12/11/2024 See Note   Final    Comment: Lupus anticoagulant not detected.  This panel did not detect evidence for heparin, direct thrombin  inhibitors, or direct Xa inhibitors and drug neutralization was  not performed.  Lupus anticoagulant antibodies are heterogeneous and antibody  titers fluctuate over time. Laboratory tests used to identify  lupus anticoagulant demonstrate variable sensitivity. Testing is  best performed when the patient is not acutely ill and not  anticoagulated. If there is strong clinical suspicion for  antiphospholipid antibody syndrome (APS), consider testing for  cardiolipin and beta-2 glycoprotein 1 antibodies (IgG and IgM) if  this testing has not already been performed.  INTERPRETIVE INFORMATION: Lupus Anticoagulant Reflex Panel  This test was developed and its performance characteristics  determined by [facility]. It has not been cleared or  approved by the U.S. Food and Drug Administration. This test was  performed in a CLIA-certified laboratory and is intended for  clinical purposes.  P                           erformed By: [facility]  [address] [place] [address]  : Rashid Griffith MD, PhD  CLIA Number: [id number]      Anti-Cariolipin Ab Igg 12/11/2024 <10  <=14 GPL Final    Comment: INTERPRETIVE INFORMATION: Anti-Cardiolipin IgG Ab  <=14 GPL: Negative  15-19 GPL:  Indeterminate  20-80 GPL: Low to Moderately Positive  81 GPL or above: High Positive  The persistent presence of IgG and/or IgM cardiolipin (CL)  antibodies in moderate or high levels (greater than 40 GPL and/or  greater  than 40 MPL units) is a laboratory criterion for the  diagnosis of antiphospholipid syndrome (APS). Persistence is  defined as moderate or high levels of IgG and/or IgM CL antibodies  detected in two or more specimens drawn at least 12 weeks apart (J  Throm Haemost. 2006;4:295-306). Lower positive levels of IgG  and/or IgM CL antibodies (above cutoff but less than 40 GPL and/or  less than 40 MPL units) may occur in patients with the clinical  symptoms of APS; therefore, the actual significance of these  levels is undefined. Results should not be used alone for  diagnosis and must be interpreted in light of APS-specific  clinical manifestations and/or other criteria phospholipid  antibody                            tests.      Anti-Cardiolipin Ab Igm 12/11/2024 <10  <=12 MPL Final    Comment: INTERPRETIVE INFORMATION: Anti-Cardiolipin IgM  <=12 MPL: Negative  13-19 MPL: Indeterminate  20-80 MPL: Low to Moderately Positive  81 MPL or above: High Positive  The persistent presence of IgG and/or IgM cardiolipin (CL)  antibodies in moderate or high levels (greater than 40 GPL and/or  greater than 40 MPL units) is a laboratory criterion for the  diagnosis of antiphospholipid syndrome (APS). Persistence is  defined as moderate or high levels of IgG and/or IgM CL antibodies  detected  in two or more specimens drawn at least 12 weeks apart  (J Throm Haemost. 2006;4:295-306). Lower positive levels of IgG  and/or IgM CL antibodies (above cutoff but less than 40 GPL and/or  less than 40 MPL units) may occur in patients with the clinical  symptoms of APS; therefore, the actual significance of these  levels is undefined. Results should not be used alone for  diagnosis and must be interpreted in light of  APS-specific  clinical manifestations and/or other criteria phospholipid  antibody te                           sts.      Anti-Cardiolipin Ab Iga 12/11/2024 <10  <=11 APL Final    Comment: INTERPRETIVE INFORMATION: Cardiolipin Antibodies, IgA  <=11 APL: Negative  12-19 APL: Indeterminate  20-80 APL: Low to Moderately  Positive  81 APL or above: High Positive  Performed By: [facility]  [address] [place] [id number]  : Rashid Griffith MD, PhD  CLIA Number: [id number]      Sodium 12/11/2024 133 (L)  135 - 145 mmol/L Final    Potassium 12/11/2024 4.5  3.6 - 5.5 mmol/L Final    Chloride 12/11/2024 101  96 - 112 mmol/L Final    Co2 12/11/2024 20  20 - 33 mmol/L Final    Anion Gap 12/11/2024 12.0  7.0 - 16.0 Final    Glucose 12/11/2024 87  65 - 99 mg/dL Final    Bun 12/11/2024 16  8 - 22 mg/dL Final    Creatinine 12/11/2024 0.83  0.50 - 1.40 mg/dL Final    Calcium 12/11/2024 8.4 (L)  8.5 - 10.5 mg/dL Final    Correct Calcium 12/11/2024 9.1  8.5 - 10.5 mg/dL Final    AST(SGOT) 12/11/2024 24  12 - 45 U/L Final    ALT(SGPT) 12/11/2024 17  2 - 50 U/L Final    Alkaline Phosphatase 12/11/2024 138 (H)  30 - 99 U/L Final    Total Bilirubin 12/11/2024 <0.2  0.1 - 1.5 mg/dL Final    Albumin 12/11/2024 3.1 (L)  3.2 - 4.9 g/dL Final    Total Protein 12/11/2024 6.3  6.0 - 8.2 g/dL Final    Globulin 12/11/2024 3.2  1.9 - 3.5 g/dL Final    A-G Ratio 12/11/2024 1.0  g/dL Final    WBC 12/11/2024 11.6 (H)  4.8 - 10.8 K/uL Final    RBC 12/11/2024 4.07 (L)  4.20 - 5.40 M/uL Final    Hemoglobin 12/11/2024 13.0  12.0 - 16.0 g/dL Final    Hematocrit 12/11/2024 39.5  37.0 - 47.0 % Final    MCV 12/11/2024 97.1  81.4 - 97.8 fL Final    MCH 12/11/2024 31.9  27.0 - 33.0 pg Final    MCHC 12/11/2024 32.9  32.2 - 35.5 g/dL Final    RDW 12/11/2024 46.5  35.9 - 50.0 fL Final    Platelet Count 12/11/2024 280  164 - 446 K/uL Final    MPV 12/11/2024 10.1  9.0 - 12.9 fL Final    Neutrophils-Polys 12/11/2024 74.30 (H)   44.00 - 72.00 % Final    Lymphocytes 12/11/2024 18.50 (L)  22.00 - 41.00 % Final    Monocytes 12/11/2024 5.50  0.00 - 13.40 % Final    Eosinophils 12/11/2024 0.80  0.00 - 6.90 % Final    Basophils 12/11/2024 0.30  0.00 - 1.80 % Final    Immature Granulocytes 12/11/2024 0.60  0.00 - 0.90 % Final    Nucleated RBC 12/11/2024 0.20  0.00 - 0.20 /100 WBC Final    Neutrophils (Absolute) 12/11/2024 8.62 (H)  1.82 - 7.42 K/uL Final    Includes immature neutrophils, if present.    Lymphs (Absolute) 12/11/2024 2.14  1.00 - 4.80 K/uL Final    Monos (Absolute) 12/11/2024 0.64  0.00 - 0.85 K/uL Final    Eos (Absolute) 12/11/2024 0.09  0.00 - 0.51 K/uL Final    Baso (Absolute) 12/11/2024 0.03  0.00 - 0.12 K/uL Final    Immature Granulocytes (abs) 12/11/2024 0.07  0.00 - 0.11 K/uL Final    NRBC (Absolute) 12/11/2024 0.02  K/uL Final    GFR (CKD-EPI) 12/11/2024 94  >60 mL/min/1.73 m 2 Final    Comment: Estimated Glomerular Filtration Rate is calculated using  race neutral CKD-EPI 2021 equation per NKF-ASN recommendations.         Scheduled Medications   Medication Dose Frequency    prenatal plus vitamin  1 Tablet Daily-0800    NIFEdipine SR  60 mg Q DAY        NST-as performed and read by myself; reactive NST without contractions    Impression;  IUP AT 34w2d  IUGR noted, normal dopplers and NST    2.   Preeclampsia w/o severe features  Patient stable, no signs of severe features    Plan;  Will deliver at 37 weeks via repeat c/section  Will repeat dopplers this week      Gabe Platt M.D.

## 2024-12-15 NOTE — PROGRESS NOTES
0700- report received from Meka GOLDBERG, POC discussed.  0745- pt reports good fetal movement, denies vaginal bleeding, leaking of fluid and contractions. Pt also denies headache, epigastric pain, and visual disturbances. Pt up to shower, linens changed.  0845- EFM/TOCO applied  0957- NST reactive, monitors removed

## 2024-12-15 NOTE — CARE PLAN
The patient is Stable - Low risk of patient condition declining or worsening    Shift Goals  Clinical Goals: Monitor VS.  Patient Goals: Rest and comfort.  Family Goals: support    Progress made toward(s) clinical / shift goals:    Problem: Knowledge Deficit - L&D  Goal: Patient and family/caregivers will demonstrate understanding of plan of care, disease process/condition, diagnostic tests and medications  Outcome: Progressing   POC discussed, pt verbalized understanding.   Problem: Risk for Infection and Impaired Wound Healing  Goal: Patient will remain free from infection  Outcome: Progressing   Hand hygiene and standard precautions implemented in care.   Problem: Risk for Venous Thromboembolism (VTE)  Goal: VTE prevention measures will be implemented and patient will remain free from VTE  Outcome: Progressing   SCD's in use.     Patient is not progressing towards the following goals: N/A.

## 2024-12-16 PROCEDURE — 302790 HCHG STAT ANTEPARTUM CARE, DAILY

## 2024-12-16 PROCEDURE — 59025 FETAL NON-STRESS TEST: CPT

## 2024-12-16 PROCEDURE — 99231 SBSQ HOSP IP/OBS SF/LOW 25: CPT | Mod: 25 | Performed by: STUDENT IN AN ORGANIZED HEALTH CARE EDUCATION/TRAINING PROGRAM

## 2024-12-16 PROCEDURE — A9270 NON-COVERED ITEM OR SERVICE: HCPCS | Performed by: OBSTETRICS & GYNECOLOGY

## 2024-12-16 PROCEDURE — 700102 HCHG RX REV CODE 250 W/ 637 OVERRIDE(OP): Performed by: OBSTETRICS & GYNECOLOGY

## 2024-12-16 PROCEDURE — A9270 NON-COVERED ITEM OR SERVICE: HCPCS | Performed by: STUDENT IN AN ORGANIZED HEALTH CARE EDUCATION/TRAINING PROGRAM

## 2024-12-16 PROCEDURE — 770002 HCHG ROOM/CARE - OB PRIVATE (112)

## 2024-12-16 PROCEDURE — 700102 HCHG RX REV CODE 250 W/ 637 OVERRIDE(OP): Performed by: STUDENT IN AN ORGANIZED HEALTH CARE EDUCATION/TRAINING PROGRAM

## 2024-12-16 PROCEDURE — 59025 FETAL NON-STRESS TEST: CPT | Mod: 26 | Performed by: STUDENT IN AN ORGANIZED HEALTH CARE EDUCATION/TRAINING PROGRAM

## 2024-12-16 RX ADMIN — PRENATAL WITH FERROUS FUM AND FOLIC ACID 1 TABLET: 3080; 920; 120; 400; 22; 1.84; 3; 20; 10; 1; 12; 200; 27; 25; 2 TABLET ORAL at 09:06

## 2024-12-16 RX ADMIN — NIFEDIPINE 60 MG: 30 TABLET, FILM COATED, EXTENDED RELEASE ORAL at 06:33

## 2024-12-16 ASSESSMENT — PAIN DESCRIPTION - PAIN TYPE: TYPE: ACUTE PAIN

## 2024-12-16 NOTE — CARE PLAN
The patient is Stable - Low risk of patient condition declining or worsening    Shift Goals  Clinical Goals: Monitor VS.  Patient Goals: Rest and comfort.  Family Goals: support    Progress made toward(s) clinical / shift goals:    Problem: Knowledge Deficit - L&D  Goal: Patient and family/caregivers will demonstrate understanding of plan of care, disease process/condition, diagnostic tests and medications  Outcome: Progressing   POC discussed, pt verbalized understanding.   Problem: Pain  Goal: Patient's pain will be alleviated or reduced to the patient’s comfort goal  Outcome: Progressing   Pt educated to call out if pain is present or if she is parul, pt verbalized understanding.   Problem: Risk for Injury  Goal: Patient and fetus will be free of preventable injury/complications  Outcome: Progressing   Pt educated to call for assistance, if needed.     Patient is not progressing towards the following goals: N/A.

## 2024-12-16 NOTE — PROGRESS NOTES
"ANTEPARTUM PROGRESS NOTE;    Sasha Bryant is a 34 y.o. female  at 34w3d.    SUBJECTIVE:  Patient seen and examined. She is doing this morning and currently denies any concerns or complaints. She feels well on the nifedipine 60 mg QD.  No significant events overnight. Denies HA, change in vision, RUQ/epigastric pain, SOB, CP, fever, nausea/vomiting, edema or calf pain.     Contractions: Denies  Leaking of fluid: Denies  Vaginal bleeding: Denies  Fetal movement: Present    Review of systems; denies vaginal bleeding, leakage of fluid, uterine contractions, fever chills or abdominal pain    History reviewed. No pertinent past medical history.    Physical examination;  Alert and oriented x3  Gen.-well-developed well-nourished female in no apparent distress  HEENT-normocephalic, nontraumatic,EOMI,PERRLA  BP (!) 142/86   Pulse 87   Temp 36.1 °C (96.9 °F) (Temporal)   Resp 16   Ht 1.6 m (5' 3\")   Wt 81.6 kg (179 lb 14.3 oz)   SpO2 96%   BMI 31.87 kg/m²   Abdomen-Soft, gravid, nontender  Extremities without cyanosis clubbing or edema  Neurologic grossly intact    Labs:  Lab Results   Component Value Date/Time    WBC 9.7 2024 11:10 PM    RBC 4.14 (L) 2024 11:10 PM    HEMOGLOBIN 13.1 2024 11:10 PM    HEMATOCRIT 38.6 2024 11:10 PM    MCV 93.2 2024 11:10 PM    MCH 31.6 2024 11:10 PM    MCHC 33.9 2024 11:10 PM    MPV 9.9 2024 11:10 PM    NEUTSPOLYS 71.70 2024 11:10 PM    LYMPHOCYTES 19.20 (L) 2024 11:10 PM    MONOCYTES 7.00 2024 11:10 PM    EOSINOPHILS 1.10 2024 11:10 PM    BASOPHILS 0.40 2024 11:10 PM       Lab Results   Component Value Date/Time    SODIUM 134 (L) 2024 11:10 PM    POTASSIUM 4.7 2024 11:10 PM    CHLORIDE 104 2024 11:10 PM    CO2 17 (L) 2024 11:10 PM    GLUCOSE 81 2024 11:10 PM    BUN 15 2024 11:10 PM    CALCIUM 8.6 2024 11:10 PM    CREATININE 0.59 2024 11:10 PM     "       Scheduled Medications   Medication Dose Frequency    prenatal plus vitamin  1 Tablet Daily-0800    NIFEdipine SR  60 mg Q DAY        NST  Indication: pre-eclampsia w/out severe features  NST Interpretation: Category 1  Baseline 140, reactive, Variability  6-25 BPM, Accelerations: Yes, Decelerations: No       Assessment  IUP AT   Sasha Bryant is a 34 y.o. female  at 34w3d who was re-admitted 2024 for preelampsia w/o severe features s/p being admitted on 24 with gestational HTN, pre-eclampsia with severe features per 24h urine protein of 6762 and severe range pressures, and FGR with EFW 5%. Completed BMZ x 2 on  and stopped magnesium 12/10. CHIRAG and UA Dopplers  WNL. She is resting comfortably in bed this morning and currently denies any complaints. Since increasing her nifidipine to 60 mg QD she states she feels improved.     Plan;  1. Continue observation and expectant management.   2. NST daily    -reactive 2024  3. Pre-eclampsia w/o severe features   -Continue Nifedipine XL 60 mg/day  4. IUGR   -Plan to repeat dopplers this week  5. Hx of    -plan for repeat  at 37 weeks    Patient was seen in conjunction with MD JELANI Marcial, MD PALOMARES who consulted and agrees with the plan.      Pricila Valencia P.A.-C.  [facility]   Obstetrics and Gynecology  2024     9:02 AM

## 2024-12-16 NOTE — CARE PLAN
The patient is Stable - Low risk of patient condition declining or worsening    Shift Goals  Clinical Goals: monitor BPs and FHT  Patient Goals: rest and comfort  Family Goals: support    Progress made toward(s) clinical / shift goals:    Problem: Knowledge Deficit - L&D  Goal: Patient and family/caregivers will demonstrate understanding of plan of care, disease process/condition, diagnostic tests and medications  Outcome: Progressing     Problem: Psychosocial - L&D  Goal: Patient's level of anxiety will decrease  Outcome: Progressing       Patient is not progressing towards the following goals:

## 2024-12-17 PROCEDURE — 59025 FETAL NON-STRESS TEST: CPT

## 2024-12-17 PROCEDURE — 700102 HCHG RX REV CODE 250 W/ 637 OVERRIDE(OP): Performed by: OBSTETRICS & GYNECOLOGY

## 2024-12-17 PROCEDURE — 99232 SBSQ HOSP IP/OBS MODERATE 35: CPT | Mod: 25 | Performed by: STUDENT IN AN ORGANIZED HEALTH CARE EDUCATION/TRAINING PROGRAM

## 2024-12-17 PROCEDURE — A9270 NON-COVERED ITEM OR SERVICE: HCPCS | Performed by: OBSTETRICS & GYNECOLOGY

## 2024-12-17 PROCEDURE — 700111 HCHG RX REV CODE 636 W/ 250 OVERRIDE (IP): Mod: JZ | Performed by: OBSTETRICS & GYNECOLOGY

## 2024-12-17 PROCEDURE — 700105 HCHG RX REV CODE 258: Performed by: OBSTETRICS & GYNECOLOGY

## 2024-12-17 PROCEDURE — 302790 HCHG STAT ANTEPARTUM CARE, DAILY

## 2024-12-17 PROCEDURE — 59025 FETAL NON-STRESS TEST: CPT | Mod: 26 | Performed by: STUDENT IN AN ORGANIZED HEALTH CARE EDUCATION/TRAINING PROGRAM

## 2024-12-17 PROCEDURE — A9270 NON-COVERED ITEM OR SERVICE: HCPCS | Performed by: STUDENT IN AN ORGANIZED HEALTH CARE EDUCATION/TRAINING PROGRAM

## 2024-12-17 PROCEDURE — 700102 HCHG RX REV CODE 250 W/ 637 OVERRIDE(OP): Performed by: STUDENT IN AN ORGANIZED HEALTH CARE EDUCATION/TRAINING PROGRAM

## 2024-12-17 PROCEDURE — 770002 HCHG ROOM/CARE - OB PRIVATE (112)

## 2024-12-17 RX ORDER — MAGNESIUM SULFATE HEPTAHYDRATE 40 MG/ML
4 INJECTION, SOLUTION INTRAVENOUS ONCE
Status: COMPLETED | OUTPATIENT
Start: 2024-12-17 | End: 2024-12-17

## 2024-12-17 RX ORDER — MAGNESIUM SULFATE HEPTAHYDRATE 40 MG/ML
2 INJECTION, SOLUTION INTRAVENOUS CONTINUOUS
Status: DISCONTINUED | OUTPATIENT
Start: 2024-12-17 | End: 2024-12-20

## 2024-12-17 RX ORDER — SODIUM CHLORIDE, SODIUM LACTATE, POTASSIUM CHLORIDE, CALCIUM CHLORIDE 600; 310; 30; 20 MG/100ML; MG/100ML; MG/100ML; MG/100ML
INJECTION, SOLUTION INTRAVENOUS CONTINUOUS
Status: DISCONTINUED | OUTPATIENT
Start: 2024-12-17 | End: 2024-12-17

## 2024-12-17 RX ORDER — SODIUM CHLORIDE, SODIUM LACTATE, POTASSIUM CHLORIDE, CALCIUM CHLORIDE 600; 310; 30; 20 MG/100ML; MG/100ML; MG/100ML; MG/100ML
INJECTION, SOLUTION INTRAVENOUS CONTINUOUS
Status: DISCONTINUED | OUTPATIENT
Start: 2024-12-17 | End: 2024-12-18

## 2024-12-17 RX ORDER — CALCIUM GLUCONATE 94 MG/ML
1 INJECTION, SOLUTION INTRAVENOUS
Status: DISCONTINUED | OUTPATIENT
Start: 2024-12-17 | End: 2024-12-18

## 2024-12-17 RX ADMIN — SODIUM CHLORIDE, POTASSIUM CHLORIDE, SODIUM LACTATE AND CALCIUM CHLORIDE: 600; 310; 30; 20 INJECTION, SOLUTION INTRAVENOUS at 21:31

## 2024-12-17 RX ADMIN — MAGNESIUM SULFATE HEPTAHYDRATE 2 G/HR: 40 INJECTION, SOLUTION INTRAVENOUS at 22:17

## 2024-12-17 RX ADMIN — PRENATAL WITH FERROUS FUM AND FOLIC ACID 1 TABLET: 3080; 920; 120; 400; 22; 1.84; 3; 20; 10; 1; 12; 200; 27; 25; 2 TABLET ORAL at 07:47

## 2024-12-17 RX ADMIN — SODIUM CHLORIDE, POTASSIUM CHLORIDE, SODIUM LACTATE AND CALCIUM CHLORIDE: 600; 310; 30; 20 INJECTION, SOLUTION INTRAVENOUS at 20:04

## 2024-12-17 RX ADMIN — NIFEDIPINE 10 MG: 10 CAPSULE ORAL at 17:09

## 2024-12-17 RX ADMIN — MAGNESIUM SULFATE HEPTAHYDRATE 4 G: 4 INJECTION, SOLUTION INTRAVENOUS at 21:33

## 2024-12-17 RX ADMIN — NIFEDIPINE 60 MG: 30 TABLET, FILM COATED, EXTENDED RELEASE ORAL at 05:56

## 2024-12-17 ASSESSMENT — PAIN DESCRIPTION - PAIN TYPE: TYPE: ACUTE PAIN

## 2024-12-17 NOTE — CARE PLAN
The patient is Watcher - Medium risk of patient condition declining or worsening    Shift Goals  Clinical Goals: monitor BPs  Patient Goals: rest  Family Goals: support    Progress made toward(s) clinical / shift goals:    Problem: Knowledge Deficit - L&D  Goal: Patient and family/caregivers will demonstrate understanding of plan of care, disease process/condition, diagnostic tests and medications  Outcome: Progressing - pt active participant in POC     Problem: Risk for Injury  Goal: Patient and fetus will be free of preventable injury/complications  Outcome: Progressing - pt and fetus monitored throughout shift      Problem: Risk for Excess Fluid Volume  Goal: Patient will demonstrate pulse, blood pressure and neurologic signs within expected ranges and without any respiratory complications  Outcome: Progressing - pt BP WNL

## 2024-12-17 NOTE — PROGRESS NOTES
"ANTEPARTUM PROGRESS NOTE;    Sasha Bryant is a 34 y.o. female  at 34w4d.    SUBJECTIVE:  Patient seen and examined. She is doing well this morning and currently denies any concerns or complaints here today. No significant events overnight. Denies HA, change in vision, RUQ/epigastric pain, SOB, CP, fever, nausea/vomiting, edema or calf pain.    Contractions: Denies  Leaking of fluid: Denies  Vaginal bleeding: Denies  Fetal movement: Present    Review of systems; denies vaginal bleeding, leakage of fluid, uterine contractions, fever chills or abdominal pain    History reviewed. No pertinent past medical history.    Physical examination;  Alert and oriented x3  Gen.-well-developed well-nourished female in no apparent distress  HEENT-normocephalic, nontraumatic,EOMI,PERRLA  BP (!) 143/83   Pulse 93   Temp 36.1 °C (97 °F) (Temporal)   Resp 18   Ht 1.6 m (5' 3\")   Wt 81.6 kg (179 lb 14.3 oz)   SpO2 93%   BMI 31.87 kg/m²   Abdomen-Soft, gravid, nontender  Extremities without cyanosis clubbing or edema  Neurologic grossly intact    Labs:  Lab Results   Component Value Date/Time    WBC 9.7 2024 11:10 PM    RBC 4.14 (L) 2024 11:10 PM    HEMOGLOBIN 13.1 2024 11:10 PM    HEMATOCRIT 38.6 2024 11:10 PM    MCV 93.2 2024 11:10 PM    MCH 31.6 2024 11:10 PM    MCHC 33.9 2024 11:10 PM    MPV 9.9 2024 11:10 PM    NEUTSPOLYS 71.70 2024 11:10 PM    LYMPHOCYTES 19.20 (L) 2024 11:10 PM    MONOCYTES 7.00 2024 11:10 PM    EOSINOPHILS 1.10 2024 11:10 PM    BASOPHILS 0.40 2024 11:10 PM       Lab Results   Component Value Date/Time    SODIUM 134 (L) 2024 11:10 PM    POTASSIUM 4.7 2024 11:10 PM    CHLORIDE 104 2024 11:10 PM    CO2 17 (L) 2024 11:10 PM    GLUCOSE 81 2024 11:10 PM    BUN 15 2024 11:10 PM    CALCIUM 8.6 2024 11:10 PM    CREATININE 0.59 2024 11:10 PM           Scheduled Medications "   Medication Dose Frequency    prenatal plus vitamin  1 Tablet Daily-0800    NIFEdipine SR  60 mg Q DAY        NST  Indication: IUGR  NST Interpretation: Category 1  Baseline 140, reactive, Variability  6-25 BPM, Accelerations: Yes, Decelerations: No       Assessment  IUP AT   Sasha Bryant is a 34 y.o. female  at 34w4d who was re-admitted 2024 for preelampsia w/o severe features s/p being admitted on 24 with gestational HTN, pre-eclampsia with severe features per 24h urine protein of 6762 and severe range pressures, and FGR with EFW 5%. Completed BMZ x 2 on  and stopped magnesium 12/10. CHIRAG and UA Dopplers  WNL. She is resting comfortably in bed this morning and currently denies any complaints. Since increasing her nifidipine to 60 mg QD she states she feels improved. BP this AM was 143/83. Denies any symptoms.     Plan;  1. Continue observation and expectant management.   2. NST daily               -reactive 2024  3. Pre-eclampsia w/o severe features              -Continue Nifedipine XL 60 mg/day  4. IUGR              -Plan to repeat dopplers this week  5. Hx of               -Plan for repeat  at 37 weeks    Patient was seen in conjunction with MD JELANI Marcial, MD PALOMARES who consulted and agrees with the plan.      Pricila Valencia P.A.-C.  [facility]   Obstetrics and Gynecology  2024     9:02 AM

## 2024-12-17 NOTE — Clinical Note
"ANTEPARTUM PROGRESS NOTE;    Sasha Bryant is a 34 y.o. female  at 34w4d.    SUBJECTIVE:  Patient seen and examined. She is doing ***. No significant events overnight ***. Denies HA, change in vision, RUQ/epigastric pain, SOB, CP, fever, nausea/vomiting, edema or calf pain. ***    Contractions: ***  Leaking of fluid: ***  Vaginal bleeding: ***  Fetal movement: ***    Review of systems; denies vaginal bleeding, leakage of fluid, uterine contractions, fever chills or abdominal pain    History reviewed. No pertinent past medical history.    Physical examination;  Alert and oriented x3  Gen.-well-developed well-nourished female in no apparent distress  HEENT-normocephalic, nontraumatic,EOMI,PERRLA  BP (!) 147/92   Pulse 80   Temp 35.9 °C (96.6 °F) (Temporal)   Resp 16   Ht 1.6 m (5' 3\")   Wt 81.6 kg (179 lb 14.3 oz)   SpO2 95%   BMI 31.87 kg/m²   Abdomen-Soft, gravid, nontender  Extremities without cyanosis clubbing or edema  Neurologic grossly intact    Labs:  Lab Results   Component Value Date/Time    WBC 9.7 2024 11:10 PM    RBC 4.14 (L) 2024 11:10 PM    HEMOGLOBIN 13.1 2024 11:10 PM    HEMATOCRIT 38.6 2024 11:10 PM    MCV 93.2 2024 11:10 PM    MCH 31.6 2024 11:10 PM    MCHC 33.9 2024 11:10 PM    MPV 9.9 2024 11:10 PM    NEUTSPOLYS 71.70 2024 11:10 PM    LYMPHOCYTES 19.20 (L) 2024 11:10 PM    MONOCYTES 7.00 2024 11:10 PM    EOSINOPHILS 1.10 2024 11:10 PM    BASOPHILS 0.40 2024 11:10 PM       Lab Results   Component Value Date/Time    SODIUM 134 (L) 2024 11:10 PM    POTASSIUM 4.7 2024 11:10 PM    CHLORIDE 104 2024 11:10 PM    CO2 17 (L) 2024 11:10 PM    GLUCOSE 81 2024 11:10 PM    BUN 15 2024 11:10 PM    CALCIUM 8.6 2024 11:10 PM    CREATININE 0.59 2024 11:10 PM           Scheduled Medications   Medication Dose Frequency    prenatal plus vitamin  1 Tablet Daily-0800    " "NIFEdipine SR  60 mg Q DAY        NST  Indication: {obgyn nst indications:523327}  NST Interpretation: Category 1  Baseline ***, {obgyn fetal nst findings:202795}, Variability {CKNST:88165}, Accelerations: {YES/NO:}, Decelerations: {peds no yes:::\"No \"}      Assessment  IUP AT   Sasha Bryant is a 34 y.o. female  at 34w4d admitted with ***.   ***    Plan;  1. Continue observation  2. NST daily   3. ***  4. ***    Patient was seen in conjunction with {CKproviders:69244::\"Fabien Duron MD [facility]\"}, MD [facility] who consulted and agrees with the plan.      Pricila Valencia P.A.-C.  [facility]   Obstetrics and Gynecology  2024     8:56 AM    "

## 2024-12-17 NOTE — CARE PLAN
The patient is Stable - Low risk of patient condition declining or worsening    Shift Goals  Clinical Goals: monitor BPs  Patient Goals: rest  Family Goals: support    Progress made toward(s) clinical / shift goals:    Problem: Knowledge Deficit - L&D  Goal: Patient and family/caregivers will demonstrate understanding of plan of care, disease process/condition, diagnostic tests and medications  Outcome: Progressing  POC discussed     Problem: Cardiac Output  Goal: Patient will remain normotensive throughout hospitalization  Outcome: Progressing  BP monitoring per orders     Problem: Risk for Venous Thromboembolism (VTE)  Goal: VTE prevention measures will be implemented and patient will remain free from VTE  Outcome: Progressing  Pt ambulatory

## 2024-12-17 NOTE — PROGRESS NOTES
1900: Report received from Thalia GOLDBERG, care assumed.    2015: Pt denies ctx, LOF, bleeding, reports + FM. Pt denies HA, visual changes, epigastric pain.    0655: report given to Aury GOLDBERG, care relinquished.

## 2024-12-17 NOTE — PROGRESS NOTES
9258 - Report received from Amber GOLDBERG, care assumed.     1500 - Update given to Carole Burciaga regarding pt Bps, no new orders received at this time, will continue to monitor.     1900 - Report given to Jordana GOLDBERG, care transferred.

## 2024-12-18 ENCOUNTER — ANESTHESIA EVENT (OUTPATIENT)
Dept: OBGYN | Facility: MEDICAL CENTER | Age: 34
End: 2024-12-18
Payer: COMMERCIAL

## 2024-12-18 ENCOUNTER — ANESTHESIA (OUTPATIENT)
Dept: OBGYN | Facility: MEDICAL CENTER | Age: 34
End: 2024-12-18
Payer: COMMERCIAL

## 2024-12-18 PROBLEM — R58 HEMORRHAGE: Status: ACTIVE | Noted: 2024-12-18

## 2024-12-18 LAB
ABO GROUP BLD: NORMAL
ALBUMIN SERPL BCP-MCNC: 2.8 G/DL (ref 3.2–4.9)
ALBUMIN/GLOB SERPL: 0.9 G/DL
ALP SERPL-CCNC: 131 U/L (ref 30–99)
ALT SERPL-CCNC: 10 U/L (ref 2–50)
ANION GAP SERPL CALC-SCNC: 9 MMOL/L (ref 7–16)
AST SERPL-CCNC: 24 U/L (ref 12–45)
BARCODED ABORH UBTYP: 5100
BARCODED ABORH UBTYP: 5100
BARCODED PRD CODE UBPRD: NORMAL
BARCODED PRD CODE UBPRD: NORMAL
BARCODED UNIT NUM UBUNT: NORMAL
BARCODED UNIT NUM UBUNT: NORMAL
BILIRUB SERPL-MCNC: <0.2 MG/DL (ref 0.1–1.5)
BLD GP AB SCN SERPL QL: NORMAL
BUN SERPL-MCNC: 14 MG/DL (ref 8–22)
CALCIUM ALBUM COR SERPL-MCNC: 9.6 MG/DL (ref 8.5–10.5)
CALCIUM SERPL-MCNC: 8.6 MG/DL (ref 8.5–10.5)
CHLORIDE SERPL-SCNC: 105 MMOL/L (ref 96–112)
CO2 SERPL-SCNC: 18 MMOL/L (ref 20–33)
COMPONENT R 8504R: NORMAL
COMPONENT R 8504R: NORMAL
CREAT SERPL-MCNC: 0.82 MG/DL (ref 0.5–1.4)
ERYTHROCYTE [DISTWIDTH] IN BLOOD BY AUTOMATED COUNT: 43.8 FL (ref 35.9–50)
ERYTHROCYTE [DISTWIDTH] IN BLOOD BY AUTOMATED COUNT: 44.1 FL (ref 35.9–50)
ERYTHROCYTE [DISTWIDTH] IN BLOOD BY AUTOMATED COUNT: 52 FL (ref 35.9–50)
GFR SERPLBLD CREATININE-BSD FMLA CKD-EPI: 95 ML/MIN/1.73 M 2
GLOBULIN SER CALC-MCNC: 3 G/DL (ref 1.9–3.5)
GLUCOSE SERPL-MCNC: 102 MG/DL (ref 65–99)
HCT VFR BLD AUTO: 29.6 % (ref 37–47)
HCT VFR BLD AUTO: 36.9 % (ref 37–47)
HCT VFR BLD AUTO: 37.1 % (ref 37–47)
HGB BLD-MCNC: 10.1 G/DL (ref 12–16)
HGB BLD-MCNC: 12.6 G/DL (ref 12–16)
HGB BLD-MCNC: 12.6 G/DL (ref 12–16)
MAGNESIUM SERPL-MCNC: 4.6 MG/DL (ref 1.5–2.5)
MAGNESIUM SERPL-MCNC: 6.3 MG/DL (ref 1.5–2.5)
MAGNESIUM SERPL-MCNC: 6.7 MG/DL (ref 1.5–2.5)
MCH RBC QN AUTO: 31.5 PG (ref 27–33)
MCH RBC QN AUTO: 32 PG (ref 27–33)
MCH RBC QN AUTO: 32 PG (ref 27–33)
MCHC RBC AUTO-ENTMCNC: 34 G/DL (ref 32.2–35.5)
MCHC RBC AUTO-ENTMCNC: 34.1 G/DL (ref 32.2–35.5)
MCHC RBC AUTO-ENTMCNC: 34.1 G/DL (ref 32.2–35.5)
MCV RBC AUTO: 92.3 FL (ref 81.4–97.8)
MCV RBC AUTO: 93.7 FL (ref 81.4–97.8)
MCV RBC AUTO: 94.2 FL (ref 81.4–97.8)
PLATELET # BLD AUTO: 205 K/UL (ref 164–446)
PLATELET # BLD AUTO: 262 K/UL (ref 164–446)
PLATELET # BLD AUTO: 267 K/UL (ref 164–446)
PMV BLD AUTO: 10 FL (ref 9–12.9)
POTASSIUM SERPL-SCNC: 4.2 MMOL/L (ref 3.6–5.5)
PRODUCT TYPE UPROD: NORMAL
PRODUCT TYPE UPROD: NORMAL
PROT SERPL-MCNC: 5.8 G/DL (ref 6–8.2)
RBC # BLD AUTO: 3.16 M/UL (ref 4.2–5.4)
RBC # BLD AUTO: 3.94 M/UL (ref 4.2–5.4)
RBC # BLD AUTO: 4 M/UL (ref 4.2–5.4)
RH BLD: NORMAL
SODIUM SERPL-SCNC: 132 MMOL/L (ref 135–145)
UNIT STATUS USTAT: NORMAL
UNIT STATUS USTAT: NORMAL
WBC # BLD AUTO: 10.9 K/UL (ref 4.8–10.8)
WBC # BLD AUTO: 23.6 K/UL (ref 4.8–10.8)
WBC # BLD AUTO: 9.4 K/UL (ref 4.8–10.8)

## 2024-12-18 PROCEDURE — 86850 RBC ANTIBODY SCREEN: CPT

## 2024-12-18 PROCEDURE — 700111 HCHG RX REV CODE 636 W/ 250 OVERRIDE (IP): Performed by: OBSTETRICS & GYNECOLOGY

## 2024-12-18 PROCEDURE — 83735 ASSAY OF MAGNESIUM: CPT | Mod: 91

## 2024-12-18 PROCEDURE — 36415 COLL VENOUS BLD VENIPUNCTURE: CPT

## 2024-12-18 PROCEDURE — 160048 HCHG OR STATISTICAL LEVEL 1-5: Performed by: OBSTETRICS & GYNECOLOGY

## 2024-12-18 PROCEDURE — 700111 HCHG RX REV CODE 636 W/ 250 OVERRIDE (IP): Mod: JZ | Performed by: ANESTHESIOLOGY

## 2024-12-18 PROCEDURE — 30233N1 TRANSFUSION OF NONAUTOLOGOUS RED BLOOD CELLS INTO PERIPHERAL VEIN, PERCUTANEOUS APPROACH: ICD-10-PCS

## 2024-12-18 PROCEDURE — A9270 NON-COVERED ITEM OR SERVICE: HCPCS

## 2024-12-18 PROCEDURE — 36430 TRANSFUSION BLD/BLD COMPNT: CPT

## 2024-12-18 PROCEDURE — 80053 COMPREHEN METABOLIC PANEL: CPT

## 2024-12-18 PROCEDURE — A9270 NON-COVERED ITEM OR SERVICE: HCPCS | Performed by: ANESTHESIOLOGY

## 2024-12-18 PROCEDURE — 59514 CESAREAN DELIVERY ONLY: CPT | Mod: 80 | Performed by: OBSTETRICS & GYNECOLOGY

## 2024-12-18 PROCEDURE — C1755 CATHETER, INTRASPINAL: HCPCS | Performed by: OBSTETRICS & GYNECOLOGY

## 2024-12-18 PROCEDURE — 0W3R0ZZ CONTROL BLEEDING IN GENITOURINARY TRACT, OPEN APPROACH: ICD-10-PCS

## 2024-12-18 PROCEDURE — 88307 TISSUE EXAM BY PATHOLOGIST: CPT

## 2024-12-18 PROCEDURE — P9016 RBC LEUKOCYTES REDUCED: HCPCS | Mod: 91

## 2024-12-18 PROCEDURE — 303615 HCHG EPIDURAL/SPINAL ANESTHESIA FOR LABOR

## 2024-12-18 PROCEDURE — 160009 HCHG ANES TIME/MIN: Performed by: OBSTETRICS & GYNECOLOGY

## 2024-12-18 PROCEDURE — 700105 HCHG RX REV CODE 258: Performed by: ANESTHESIOLOGY

## 2024-12-18 PROCEDURE — 700102 HCHG RX REV CODE 250 W/ 637 OVERRIDE(OP): Performed by: OBSTETRICS & GYNECOLOGY

## 2024-12-18 PROCEDURE — 160035 HCHG PACU - 1ST 60 MINS PHASE I: Performed by: OBSTETRICS & GYNECOLOGY

## 2024-12-18 PROCEDURE — 160041 HCHG SURGERY MINUTES - EA ADDL 1 MIN LEVEL 4: Performed by: OBSTETRICS & GYNECOLOGY

## 2024-12-18 PROCEDURE — 86901 BLOOD TYPING SEROLOGIC RH(D): CPT

## 2024-12-18 PROCEDURE — 86923 COMPATIBILITY TEST ELECTRIC: CPT

## 2024-12-18 PROCEDURE — A9270 NON-COVERED ITEM OR SERVICE: HCPCS | Performed by: OBSTETRICS & GYNECOLOGY

## 2024-12-18 PROCEDURE — 700102 HCHG RX REV CODE 250 W/ 637 OVERRIDE(OP)

## 2024-12-18 PROCEDURE — 700102 HCHG RX REV CODE 250 W/ 637 OVERRIDE(OP): Performed by: ANESTHESIOLOGY

## 2024-12-18 PROCEDURE — 160002 HCHG RECOVERY MINUTES (STAT): Performed by: OBSTETRICS & GYNECOLOGY

## 2024-12-18 PROCEDURE — 770002 HCHG ROOM/CARE - OB PRIVATE (112)

## 2024-12-18 PROCEDURE — 99232 SBSQ HOSP IP/OBS MODERATE 35: CPT | Performed by: OBSTETRICS & GYNECOLOGY

## 2024-12-18 PROCEDURE — 86900 BLOOD TYPING SEROLOGIC ABO: CPT

## 2024-12-18 PROCEDURE — 85027 COMPLETE CBC AUTOMATED: CPT

## 2024-12-18 PROCEDURE — 700111 HCHG RX REV CODE 636 W/ 250 OVERRIDE (IP): Performed by: ANESTHESIOLOGY

## 2024-12-18 PROCEDURE — 59514 CESAREAN DELIVERY ONLY: CPT | Performed by: OBSTETRICS & GYNECOLOGY

## 2024-12-18 PROCEDURE — 700101 HCHG RX REV CODE 250: Performed by: ANESTHESIOLOGY

## 2024-12-18 PROCEDURE — 160029 HCHG SURGERY MINUTES - 1ST 30 MINS LEVEL 4: Performed by: OBSTETRICS & GYNECOLOGY

## 2024-12-18 RX ORDER — ACETAMINOPHEN 500 MG
1000 TABLET ORAL EVERY 6 HOURS PRN
Status: DISCONTINUED | OUTPATIENT
Start: 2024-12-22 | End: 2024-12-21 | Stop reason: HOSPADM

## 2024-12-18 RX ORDER — HYDROMORPHONE HYDROCHLORIDE 1 MG/ML
0.4 INJECTION, SOLUTION INTRAMUSCULAR; INTRAVENOUS; SUBCUTANEOUS
Status: DISCONTINUED | OUTPATIENT
Start: 2024-12-18 | End: 2024-12-18

## 2024-12-18 RX ORDER — TRANEXAMIC ACID 100 MG/ML
INJECTION, SOLUTION INTRAVENOUS PRN
Status: DISCONTINUED | OUTPATIENT
Start: 2024-12-18 | End: 2024-12-18 | Stop reason: SURG

## 2024-12-18 RX ORDER — MIDAZOLAM HYDROCHLORIDE 1 MG/ML
1 INJECTION INTRAMUSCULAR; INTRAVENOUS
Status: DISCONTINUED | OUTPATIENT
Start: 2024-12-18 | End: 2024-12-18

## 2024-12-18 RX ORDER — HALOPERIDOL 5 MG/ML
1 INJECTION INTRAMUSCULAR
Status: DISCONTINUED | OUTPATIENT
Start: 2024-12-18 | End: 2024-12-18

## 2024-12-18 RX ORDER — METOPROLOL TARTRATE 1 MG/ML
1 INJECTION, SOLUTION INTRAVENOUS
Status: DISCONTINUED | OUTPATIENT
Start: 2024-12-18 | End: 2024-12-18

## 2024-12-18 RX ORDER — ONDANSETRON 2 MG/ML
4 INJECTION INTRAMUSCULAR; INTRAVENOUS EVERY 6 HOURS PRN
Status: DISCONTINUED | OUTPATIENT
Start: 2024-12-19 | End: 2024-12-21 | Stop reason: HOSPADM

## 2024-12-18 RX ORDER — HYDROMORPHONE HYDROCHLORIDE 1 MG/ML
0.4 INJECTION, SOLUTION INTRAMUSCULAR; INTRAVENOUS; SUBCUTANEOUS
Status: ACTIVE | OUTPATIENT
Start: 2024-12-18 | End: 2024-12-19

## 2024-12-18 RX ORDER — EPHEDRINE SULFATE 50 MG/ML
5 INJECTION, SOLUTION INTRAVENOUS
Status: DISCONTINUED | OUTPATIENT
Start: 2024-12-18 | End: 2024-12-18 | Stop reason: HOSPADM

## 2024-12-18 RX ORDER — DIPHENHYDRAMINE HYDROCHLORIDE 50 MG/ML
25 INJECTION INTRAMUSCULAR; INTRAVENOUS EVERY 6 HOURS PRN
Status: ACTIVE | OUTPATIENT
Start: 2024-12-18 | End: 2024-12-19

## 2024-12-18 RX ORDER — DIPHENHYDRAMINE HYDROCHLORIDE 50 MG/ML
25 INJECTION INTRAMUSCULAR; INTRAVENOUS EVERY 6 HOURS PRN
Status: DISCONTINUED | OUTPATIENT
Start: 2024-12-19 | End: 2024-12-21 | Stop reason: HOSPADM

## 2024-12-18 RX ORDER — PHENYLEPHRINE HCL IN 0.9% NACL 1 MG/10 ML
SYRINGE (ML) INTRAVENOUS PRN
Status: DISCONTINUED | OUTPATIENT
Start: 2024-12-18 | End: 2024-12-18 | Stop reason: SURG

## 2024-12-18 RX ORDER — ONDANSETRON 2 MG/ML
4 INJECTION INTRAMUSCULAR; INTRAVENOUS EVERY 6 HOURS PRN
Status: ACTIVE | OUTPATIENT
Start: 2024-12-18 | End: 2024-12-19

## 2024-12-18 RX ORDER — CARBOPROST TROMETHAMINE 250 UG/ML
INJECTION, SOLUTION INTRAMUSCULAR PRN
Status: DISCONTINUED | OUTPATIENT
Start: 2024-12-18 | End: 2024-12-18 | Stop reason: SURG

## 2024-12-18 RX ORDER — LABETALOL HYDROCHLORIDE 5 MG/ML
10 INJECTION, SOLUTION INTRAVENOUS
Status: DISCONTINUED | OUTPATIENT
Start: 2024-12-18 | End: 2024-12-21 | Stop reason: HOSPADM

## 2024-12-18 RX ORDER — METOCLOPRAMIDE HYDROCHLORIDE 5 MG/ML
10 INJECTION INTRAMUSCULAR; INTRAVENOUS ONCE
Status: COMPLETED | OUTPATIENT
Start: 2024-12-18 | End: 2024-12-18

## 2024-12-18 RX ORDER — ONDANSETRON 2 MG/ML
4 INJECTION INTRAMUSCULAR; INTRAVENOUS
Status: DISCONTINUED | OUTPATIENT
Start: 2024-12-18 | End: 2024-12-18

## 2024-12-18 RX ORDER — SODIUM CHLORIDE, SODIUM LACTATE, POTASSIUM CHLORIDE, CALCIUM CHLORIDE 600; 310; 30; 20 MG/100ML; MG/100ML; MG/100ML; MG/100ML
2000 INJECTION, SOLUTION INTRAVENOUS PRN
Status: DISCONTINUED | OUTPATIENT
Start: 2024-12-18 | End: 2024-12-21 | Stop reason: HOSPADM

## 2024-12-18 RX ORDER — MISOPROSTOL 200 UG/1
1000 TABLET ORAL ONCE
Status: COMPLETED | OUTPATIENT
Start: 2024-12-18 | End: 2024-12-18

## 2024-12-18 RX ORDER — ACETAMINOPHEN 500 MG
1000 TABLET ORAL EVERY 6 HOURS
Status: DISPENSED | OUTPATIENT
Start: 2024-12-18 | End: 2024-12-19

## 2024-12-18 RX ORDER — EPHEDRINE SULFATE 50 MG/ML
INJECTION, SOLUTION INTRAVENOUS PRN
Status: DISCONTINUED | OUTPATIENT
Start: 2024-12-18 | End: 2024-12-18 | Stop reason: SURG

## 2024-12-18 RX ORDER — CEFAZOLIN SODIUM 1 G/3ML
INJECTION, POWDER, FOR SOLUTION INTRAMUSCULAR; INTRAVENOUS PRN
Status: DISCONTINUED | OUTPATIENT
Start: 2024-12-18 | End: 2024-12-18 | Stop reason: SURG

## 2024-12-18 RX ORDER — CARBOPROST TROMETHAMINE 250 UG/ML
INJECTION, SOLUTION INTRAMUSCULAR
Status: COMPLETED
Start: 2024-12-18 | End: 2024-12-18

## 2024-12-18 RX ORDER — DIPHENHYDRAMINE HYDROCHLORIDE 50 MG/ML
12.5 INJECTION INTRAMUSCULAR; INTRAVENOUS EVERY 6 HOURS PRN
Status: ACTIVE | OUTPATIENT
Start: 2024-12-18 | End: 2024-12-19

## 2024-12-18 RX ORDER — ALBUTEROL SULFATE 5 MG/ML
2.5 SOLUTION RESPIRATORY (INHALATION)
Status: DISCONTINUED | OUTPATIENT
Start: 2024-12-18 | End: 2024-12-18

## 2024-12-18 RX ORDER — DOCUSATE SODIUM 100 MG/1
100 CAPSULE, LIQUID FILLED ORAL 2 TIMES DAILY PRN
Status: DISCONTINUED | OUTPATIENT
Start: 2024-12-18 | End: 2024-12-21 | Stop reason: HOSPADM

## 2024-12-18 RX ORDER — HYDRALAZINE HYDROCHLORIDE 20 MG/ML
5 INJECTION INTRAMUSCULAR; INTRAVENOUS
Status: DISCONTINUED | OUTPATIENT
Start: 2024-12-18 | End: 2024-12-18

## 2024-12-18 RX ORDER — EPHEDRINE SULFATE 50 MG/ML
10 INJECTION, SOLUTION INTRAVENOUS
Status: ACTIVE | OUTPATIENT
Start: 2024-12-18 | End: 2024-12-19

## 2024-12-18 RX ORDER — MISOPROSTOL 200 UG/1
TABLET ORAL
Status: COMPLETED
Start: 2024-12-18 | End: 2024-12-18

## 2024-12-18 RX ORDER — MORPHINE SULFATE 0.5 MG/ML
INJECTION, SOLUTION EPIDURAL; INTRATHECAL; INTRAVENOUS PRN
Status: DISCONTINUED | OUTPATIENT
Start: 2024-12-18 | End: 2024-12-18 | Stop reason: SURG

## 2024-12-18 RX ORDER — OXYCODONE HYDROCHLORIDE 10 MG/1
10 TABLET ORAL EVERY 4 HOURS PRN
Status: ACTIVE | OUTPATIENT
Start: 2024-12-18 | End: 2024-12-19

## 2024-12-18 RX ORDER — ACETAMINOPHEN 500 MG
1000 TABLET ORAL EVERY 6 HOURS
Status: DISCONTINUED | OUTPATIENT
Start: 2024-12-19 | End: 2024-12-21 | Stop reason: HOSPADM

## 2024-12-18 RX ORDER — MEPERIDINE HYDROCHLORIDE 25 MG/ML
12.5 INJECTION INTRAMUSCULAR; INTRAVENOUS; SUBCUTANEOUS
Status: DISCONTINUED | OUTPATIENT
Start: 2024-12-18 | End: 2024-12-18

## 2024-12-18 RX ORDER — OXYCODONE HYDROCHLORIDE 5 MG/1
5 TABLET ORAL EVERY 4 HOURS PRN
Status: DISCONTINUED | OUTPATIENT
Start: 2024-12-19 | End: 2024-12-21 | Stop reason: HOSPADM

## 2024-12-18 RX ORDER — OXYCODONE HCL 5 MG/5 ML
10 SOLUTION, ORAL ORAL
Status: DISCONTINUED | OUTPATIENT
Start: 2024-12-18 | End: 2024-12-18

## 2024-12-18 RX ORDER — OXYCODONE HYDROCHLORIDE 5 MG/1
5 TABLET ORAL EVERY 4 HOURS PRN
Status: ACTIVE | OUTPATIENT
Start: 2024-12-18 | End: 2024-12-19

## 2024-12-18 RX ORDER — NIFEDIPINE 30 MG/1
30 TABLET, EXTENDED RELEASE ORAL DAILY
Status: DISCONTINUED | OUTPATIENT
Start: 2024-12-19 | End: 2024-12-21 | Stop reason: HOSPADM

## 2024-12-18 RX ORDER — SODIUM CHLORIDE, SODIUM GLUCONATE, SODIUM ACETATE, POTASSIUM CHLORIDE AND MAGNESIUM CHLORIDE 526; 502; 368; 37; 30 MG/100ML; MG/100ML; MG/100ML; MG/100ML; MG/100ML
1000 INJECTION, SOLUTION INTRAVENOUS ONCE
Status: COMPLETED | OUTPATIENT
Start: 2024-12-18 | End: 2024-12-18

## 2024-12-18 RX ORDER — OXYCODONE HYDROCHLORIDE 10 MG/1
10 TABLET ORAL EVERY 4 HOURS PRN
Status: DISCONTINUED | OUTPATIENT
Start: 2024-12-19 | End: 2024-12-21 | Stop reason: HOSPADM

## 2024-12-18 RX ORDER — OXYCODONE HCL 5 MG/5 ML
5 SOLUTION, ORAL ORAL
Status: DISCONTINUED | OUTPATIENT
Start: 2024-12-18 | End: 2024-12-18

## 2024-12-18 RX ORDER — DIPHENHYDRAMINE HCL 25 MG
25 TABLET ORAL EVERY 6 HOURS PRN
Status: DISCONTINUED | OUTPATIENT
Start: 2024-12-19 | End: 2024-12-21 | Stop reason: HOSPADM

## 2024-12-18 RX ORDER — LABETALOL HYDROCHLORIDE 5 MG/ML
5 INJECTION, SOLUTION INTRAVENOUS
Status: DISCONTINUED | OUTPATIENT
Start: 2024-12-18 | End: 2024-12-18

## 2024-12-18 RX ORDER — ONDANSETRON 4 MG/1
4 TABLET, ORALLY DISINTEGRATING ORAL EVERY 6 HOURS PRN
Status: DISCONTINUED | OUTPATIENT
Start: 2024-12-19 | End: 2024-12-21 | Stop reason: HOSPADM

## 2024-12-18 RX ORDER — HYDROMORPHONE HYDROCHLORIDE 1 MG/ML
0.1 INJECTION, SOLUTION INTRAMUSCULAR; INTRAVENOUS; SUBCUTANEOUS
Status: DISCONTINUED | OUTPATIENT
Start: 2024-12-18 | End: 2024-12-18

## 2024-12-18 RX ORDER — CITRIC ACID/SODIUM CITRATE 334-500MG
30 SOLUTION, ORAL ORAL ONCE
Status: COMPLETED | OUTPATIENT
Start: 2024-12-18 | End: 2024-12-18

## 2024-12-18 RX ORDER — DIPHENHYDRAMINE HYDROCHLORIDE 50 MG/ML
12.5 INJECTION INTRAMUSCULAR; INTRAVENOUS
Status: DISCONTINUED | OUTPATIENT
Start: 2024-12-18 | End: 2024-12-18

## 2024-12-18 RX ORDER — HYDROMORPHONE HYDROCHLORIDE 1 MG/ML
0.2 INJECTION, SOLUTION INTRAMUSCULAR; INTRAVENOUS; SUBCUTANEOUS
Status: ACTIVE | OUTPATIENT
Start: 2024-12-18 | End: 2024-12-19

## 2024-12-18 RX ORDER — ONDANSETRON 2 MG/ML
INJECTION INTRAMUSCULAR; INTRAVENOUS PRN
Status: DISCONTINUED | OUTPATIENT
Start: 2024-12-18 | End: 2024-12-18 | Stop reason: SURG

## 2024-12-18 RX ORDER — HYDROMORPHONE HYDROCHLORIDE 1 MG/ML
0.2 INJECTION, SOLUTION INTRAMUSCULAR; INTRAVENOUS; SUBCUTANEOUS
Status: DISCONTINUED | OUTPATIENT
Start: 2024-12-18 | End: 2024-12-18

## 2024-12-18 RX ORDER — SODIUM CHLORIDE, SODIUM LACTATE, POTASSIUM CHLORIDE, CALCIUM CHLORIDE 600; 310; 30; 20 MG/100ML; MG/100ML; MG/100ML; MG/100ML
INJECTION, SOLUTION INTRAVENOUS CONTINUOUS
Status: DISCONTINUED | OUTPATIENT
Start: 2024-12-18 | End: 2024-12-20 | Stop reason: HOSPADM

## 2024-12-18 RX ORDER — SODIUM CHLORIDE, SODIUM GLUCONATE, SODIUM ACETATE, POTASSIUM CHLORIDE AND MAGNESIUM CHLORIDE 526; 502; 368; 37; 30 MG/100ML; MG/100ML; MG/100ML; MG/100ML; MG/100ML
INJECTION, SOLUTION INTRAVENOUS
Status: DISCONTINUED | OUTPATIENT
Start: 2024-12-18 | End: 2024-12-18 | Stop reason: SURG

## 2024-12-18 RX ORDER — BUPIVACAINE HYDROCHLORIDE 7.5 MG/ML
INJECTION, SOLUTION INTRASPINAL PRN
Status: DISCONTINUED | OUTPATIENT
Start: 2024-12-18 | End: 2024-12-18 | Stop reason: SURG

## 2024-12-18 RX ORDER — KETOROLAC TROMETHAMINE 15 MG/ML
15 INJECTION, SOLUTION INTRAMUSCULAR; INTRAVENOUS EVERY 6 HOURS
Status: DISPENSED | OUTPATIENT
Start: 2024-12-18 | End: 2024-12-19

## 2024-12-18 RX ORDER — CARBOPROST TROMETHAMINE 250 UG/ML
250 INJECTION, SOLUTION INTRAMUSCULAR ONCE
Status: ACTIVE | OUTPATIENT
Start: 2024-12-18 | End: 2024-12-19

## 2024-12-18 RX ADMIN — Medication 100 MCG: at 10:14

## 2024-12-18 RX ADMIN — KETOROLAC TROMETHAMINE 15 MG: 15 INJECTION, SOLUTION INTRAMUSCULAR; INTRAVENOUS at 21:31

## 2024-12-18 RX ADMIN — FAMOTIDINE 20 MG: 10 INJECTION, SOLUTION INTRAVENOUS at 09:47

## 2024-12-18 RX ADMIN — CEFAZOLIN 2 G: 1 INJECTION, POWDER, FOR SOLUTION INTRAMUSCULAR; INTRAVENOUS at 10:10

## 2024-12-18 RX ADMIN — Medication 100 MCG: at 10:46

## 2024-12-18 RX ADMIN — METOCLOPRAMIDE HYDROCHLORIDE 10 MG: 5 INJECTION INTRAMUSCULAR; INTRAVENOUS at 09:47

## 2024-12-18 RX ADMIN — MAGNESIUM SULFATE HEPTAHYDRATE 2 G/HR: 40 INJECTION, SOLUTION INTRAVENOUS at 19:45

## 2024-12-18 RX ADMIN — ACETAMINOPHEN 1000 MG: 500 TABLET, FILM COATED ORAL at 21:31

## 2024-12-18 RX ADMIN — SODIUM CHLORIDE, SODIUM GLUCONATE, SODIUM ACETATE, POTASSIUM CHLORIDE AND MAGNESIUM CHLORIDE: 526; 502; 368; 37; 30 INJECTION, SOLUTION INTRAVENOUS at 09:57

## 2024-12-18 RX ADMIN — BUPIVACAINE HYDROCHLORIDE IN DEXTROSE 1.6 ML: 7.5 INJECTION, SOLUTION SUBARACHNOID at 10:08

## 2024-12-18 RX ADMIN — Medication 100 MCG: at 10:42

## 2024-12-18 RX ADMIN — MISOPROSTOL 1000 MCG: 200 TABLET ORAL at 10:43

## 2024-12-18 RX ADMIN — MORPHINE SULFATE 150 MCG: 0.5 INJECTION, SOLUTION EPIDURAL; INTRATHECAL; INTRAVENOUS at 10:08

## 2024-12-18 RX ADMIN — Medication 100 MCG: at 10:10

## 2024-12-18 RX ADMIN — FENTANYL CITRATE 15 MCG: 50 INJECTION, SOLUTION INTRAMUSCULAR; INTRAVENOUS at 10:08

## 2024-12-18 RX ADMIN — OXYTOCIN 500 ML: 10 INJECTION, SOLUTION INTRAMUSCULAR; INTRAVENOUS at 10:40

## 2024-12-18 RX ADMIN — OXYTOCIN 1000 ML: 10 INJECTION, SOLUTION INTRAMUSCULAR; INTRAVENOUS at 10:28

## 2024-12-18 RX ADMIN — TRANEXAMIC ACID 1000 MG: 100 INJECTION, SOLUTION INTRAVENOUS at 10:38

## 2024-12-18 RX ADMIN — SODIUM CHLORIDE, SODIUM GLUCONATE, SODIUM ACETATE, POTASSIUM CHLORIDE AND MAGNESIUM CHLORIDE 1000 ML: 526; 502; 368; 37; 30 INJECTION, SOLUTION INTRAVENOUS at 09:00

## 2024-12-18 RX ADMIN — EPHEDRINE SULFATE 10 MG: 50 INJECTION, SOLUTION INTRAVENOUS at 10:45

## 2024-12-18 RX ADMIN — CARBOPROST TROMETHAMINE 250 MCG: 250 INJECTION, SOLUTION INTRAMUSCULAR at 10:40

## 2024-12-18 RX ADMIN — NIFEDIPINE 60 MG: 30 TABLET, FILM COATED, EXTENDED RELEASE ORAL at 05:33

## 2024-12-18 RX ADMIN — ONDANSETRON 4 MG: 2 INJECTION INTRAMUSCULAR; INTRAVENOUS at 10:10

## 2024-12-18 RX ADMIN — EPHEDRINE SULFATE 10 MG: 50 INJECTION, SOLUTION INTRAVENOUS at 10:14

## 2024-12-18 RX ADMIN — ACETAMINOPHEN 1000 MG: 500 TABLET ORAL at 15:52

## 2024-12-18 RX ADMIN — SODIUM CITRATE AND CITRIC ACID MONOHYDRATE 30 ML: 334; 500 SOLUTION ORAL at 09:46

## 2024-12-18 RX ADMIN — EPHEDRINE SULFATE 10 MG: 50 INJECTION, SOLUTION INTRAVENOUS at 10:38

## 2024-12-18 RX ADMIN — EPHEDRINE SULFATE 20 MG: 50 INJECTION, SOLUTION INTRAVENOUS at 10:34

## 2024-12-18 RX ADMIN — EPHEDRINE SULFATE 10 MG: 50 INJECTION, SOLUTION INTRAVENOUS at 10:48

## 2024-12-18 RX ADMIN — Medication 100 MCG: at 10:08

## 2024-12-18 RX ADMIN — EPHEDRINE SULFATE 10 MG: 50 INJECTION, SOLUTION INTRAVENOUS at 10:32

## 2024-12-18 RX ADMIN — SODIUM CHLORIDE, POTASSIUM CHLORIDE, SODIUM LACTATE AND CALCIUM CHLORIDE: 600; 310; 30; 20 INJECTION, SOLUTION INTRAVENOUS at 12:53

## 2024-12-18 RX ADMIN — PHENYLEPHRINE HYDROCHLORIDE 50 MCG/MIN: 10 INJECTION INTRAVENOUS at 10:08

## 2024-12-18 ASSESSMENT — PAIN DESCRIPTION - PAIN TYPE
TYPE: ACUTE PAIN
TYPE: ACUTE PAIN;SURGICAL PAIN
TYPE: ACUTE PAIN;SURGICAL PAIN
TYPE: ACUTE PAIN
TYPE: ACUTE PAIN;SURGICAL PAIN
TYPE: ACUTE PAIN;SURGICAL PAIN
TYPE: ACUTE PAIN
TYPE: ACUTE PAIN
TYPE: ACUTE PAIN;SURGICAL PAIN
TYPE: ACUTE PAIN
TYPE: ACUTE PAIN
TYPE: ACUTE PAIN;SURGICAL PAIN

## 2024-12-18 ASSESSMENT — PAIN SCALES - GENERAL: PAIN_LEVEL: 0

## 2024-12-18 NOTE — PROGRESS NOTES
Called by RN secondary to repeat beaded elevated blood pressures in severe range.    Review of vital signs throughout the day, shows elevated blood pressure in the severe range and early morning, along with another 1 in the afternoon.  Patient did receive treatment at that time.    Review of blood pressure results over the last couple days does show an increase in both the systolic and diastolic readings over the last 48 to 72 hours.    Discussed the patient given the fact that pressures are now in the severe range and I have had to treat them, I would recommend delivery at this time as she has received steroids and is over 34 weeks gestation.    Patient and spouse at this time would like to hold off on delivery.  Discussed with the patient that the disease process has changed at this time and that more signs of preeclampsia with severe features are found and that the recommendation is for delivery instead of expectant management.  Patient would still like to wait and discussed the case with MFM tomorrow.  Reported that I discussed the case with Dr. Duron over the phone and that he also felt that delivery he was indicated at this time.  Patient would like to have a face-to-face discussion with Dr. Duron tomorrow.  She feels as if the news of a possible delivery tonight to really upset her and is what caused her pressures to be elevated.    Will check CBC, CMP at this time to assess for any evidence of HELLP syndrome or other severe features.  Monitor blood pressure at this time.  Start magnesium sulfate for seizure prophylaxis and keep patient n.p.o. with IV fluids running in the possibility that delivery may be needed tomorrow after discussion with MFM    Discussed with the patient that preeclampsia symptoms can often come on suddenly and rapidly and that our goal is to deliver before any significant issues are found.  She understands but would still like to wait until tomorrow to discuss with Dr. Duron  with MFM.    All questions answered

## 2024-12-18 NOTE — PROGRESS NOTES
0830: Report received from Thalia GOLDBERG, updated on POC  0908: MD notified of pt symptomatic hypotension, orders received to pause magnesium and stat lab draw  0957: PT in OR1  1028: Delivery of viable baby girl  1030: Delivery of placenta  1121: PT in PACU 1  1300: PT transferred to 236  1630: MD bedside for assessment. Verbal order received to take patient to NICU at 1800 if patient stable. Re-start Mag at 2g/hr upon return from NICU  1800: PT off the floor with this RN to NICU  1845: PT back from NICU  1900: Report given to Jordana GOLDBERG, updated on POC

## 2024-12-18 NOTE — ANESTHESIA PROCEDURE NOTES
Spinal Block    Date/Time: 12/18/2024 10:04 AM    Performed by: Barry Espinoza D.O.  Authorized by: Barry Espinoza D.O.    Patient Location:  OR  Start Time:  12/18/2024 10:04 AM  End Time:  12/18/2024 10:08 AM  Reason for Block: primary anesthetic    patient identified, IV checked, site marked, risks and benefits discussed, surgical consent, monitors and equipment checked, pre-op evaluation and timeout performed    Patient Position:  Sitting  Prep: ChloraPrep, patient draped and sterile technique    Monitoring:  Blood pressure, continuous pulse oximetry and heart rate  Approach:  Midline  Location:  L4-5  Injection Technique:  Single-shot  Skin infiltration:  Lidocaine  Strength:  1%  Dose:  3ml  Needle Type:  Pencan  Needle Gauge:  25 G  CSF flowing pre/post injection:  Yes  Sensory Level:  T4

## 2024-12-18 NOTE — ANESTHESIA PREPROCEDURE EVALUATION
Case: [id number] Date/Time: 24    Procedure:  SECTION, REPEAT (Abdomen)    Anesthesia type: Spinal    Pre-op diagnosis: Elevated blood pressure    Location: [facility] OR 01 / SURGERY LABOR AND DELIVERY    Surgeons: Luis Angel Schwartz M.D.           @ 34w5d, IUP, Mendez  Severe range BP, preeclampsia with proteinuria  Relevant Problems   Other   (positive) Labor and delivery complications, antepartum       Physical Exam    Airway   Mallampati: II  TM distance: >3 FB  Neck ROM: full       Cardiovascular - normal exam  Rhythm: regular  Rate: normal  (-) murmur     Dental - normal exam           Pulmonary - normal exam  Breath sounds clear to auscultation     Abdominal    Neurological - normal exam                   Anesthesia Plan    ASA 2       Plan - spinal   Neuraxial block will be primary anesthetic                Postoperative Plan: Postoperative administration of opioids is intended.    Pertinent diagnostic labs and testing reviewed    Informed Consent:    Anesthetic plan and risks discussed with patient.

## 2024-12-18 NOTE — OP REPORT
Operative Report -     Patient: Sasha Bryant  MRN: [id number]         Date of Surgery: 2024    Pre-operative Diagnosis:   1. IUP at 34w5d  2. Severe preeclampsia  3. History of prior  section  Post-operative Diagnosis: Same as above, placenta accreta.   Procedure: Repeat Low Transverse  Section via Pfannenstiel incision  Surgeon(s): Luis Angel Schwartz MD  Assistant(s): Vickey Oh MD  Anesthesia: Spinal  Findings: LV female infant, Apgars 8/9, weight pending; normal uterus, tubes and ovaries bilaterally. Placenta posterior fundal, adherent to uterine wall.    Complications: Uterine atony, hemorrhage  Specimens: Placenta to pathology  Meds: Ancef 2g IV prior to start, IV pitocin, TXA, hemabate, cytotec.   UOP: 800 mL  EBL: 2000 mL  IVF: 1500 ml    Indications:   Pt is a 34 y.o.  at 34w5d here for repeat  in the setting of history of prior  section and severe preeclampsia. The risks, benefits and alternatives of  section were discussed with the patient, including but not limited to infection, severe loss of blood, injury to bowel or bladder, injury to blood vessels, injury to fetus, possible need for transfusion, pelvic pain, adhesive disease or scar tissue and risk of repeat cesareans vs. trial of labor after .  All questions were answered and patient consented to the procedure.      Procedure:  The patient was taken to the operating room where spinal anesthesia was placed and found to be adequate. Preoperative antibiotics were administered and SCDs were on and functioning. Mag sulfate was stopped preoperatively. She was prepped and draped in the normal sterile fashion in the dorsal supine position with a leftward tilt. Correct time-out was performed.     A Pfanenstiel skin incision was made with the scalpel at prior  scar, keloid scar excised and carried through to the underlying layer of fascia.  The fascia was then incised in the  midline and the incision was extended laterally. The rectus muscles were  and the peritoneum entered. The peritoneal incision was then extended superiorly and inferiorly with good visualization of the bladder.    The lower uterine segment incised in a low transverse fashion with the scalpel. The uterine incision was extended bluntly with bilateral traction.     Fetus was in vertex position. The infant was delivered atraumatically; the nose and mouth were suctioned and the cord was clamped and cut. The infant was handed off to the waiting staff. The placenta had to be manually extracted and was quite adherent to the fundus. The uterus at one point became inverted during the process of removal of the placenta. Placenta was eventually removed and uterus manually replaced from inverted position. Uterine wall was inspected and large Banjo curette was used to scrape the placental attachment site. Bleeding resolved at the site.   The uterine incision was repaired with 0-vicryl in running-locked fashion. Another layer was used for additional hemostasis along with several more figure of eight sutures. There was significant persistent uterine atony while vigorous fundal massage was performed. Extra IV pitocin was administered by anesthesiology. Sequentially IV TXA, IM hemabate, cytotec WV was given. Vaginally the pads were inspected multiple times and no excessive blood loss was noted at the perineum. Atony still persisted, so B johnson suture was placed using 1-0 chromic suture with good tone finally observed. Good hemostasis was observed. At this point about 2000 ml of blood loss was suspected and decision with anesthesiologist was made to start blood transfusion.     The gutters were cleared of all clots and debris using copious irrigation. The uterus was then re-inspected to ensure hemostasis as were all subfascial tissues. Peritoneum was approximated with 2-0 vicryl in a running fashion vertically. The fascia was  re-approximated with 0-vicryl in a running fashion. The subcutaneous tissue was re-approximated using 3-0 vicryl in a running fashion. The skin was closed with 4-0 monocryl. Pressure dressing was applied     The patient tolerated the procedure well. Sponge, lap and needle counts were correct times three. The patient was taken to recovery in stable condition. Total blood loss was 2000 ml.     Luis Angel Schwartz M.D.    Obstetrics and Gynecology    12/18/2024 12:31 PM

## 2024-12-18 NOTE — ANESTHESIA POSTPROCEDURE EVALUATION
Patient: Sasha Bryant    Procedure Summary       Date: 24 Room / Location: [facility] OR 01 / SURGERY LABOR AND DELIVERY    Anesthesia Start: 957 Anesthesia Stop:     Procedure:  SECTION, REPEAT (Abdomen) Diagnosis:       Status post repeat low transverse  section      (Elevated blood pressure)    Surgeons: Luis Angel Schwartz M.D. Responsible Provider: Barry Espinoza D.O.    Anesthesia Type: spinal ASA Status: 2            Final Anesthesia Type: spinal  Last vitals  BP   Blood Pressure: 118/73    Temp   36.1 °C (96.9 °F)    Pulse   97   Resp   (!) 26    SpO2   94 %      Anesthesia Post Evaluation    Patient location during evaluation: PACU  Patient participation: complete - patient participated  Level of consciousness: awake and alert  Pain score: 0    Airway patency: patent  Anesthetic complications: no  Cardiovascular status: hemodynamically stable  Respiratory status: acceptable  Hydration status: euvolemic    PONV: none          Encounter Notable Events   Notable Event Outcome Phase Comment   Hemorrhage  Intraprocedure         Nurse Pain Score: 0 (NPRS)

## 2024-12-18 NOTE — ANESTHESIA TIME REPORT
Anesthesia Start and Stop Event Times       Date Time Event    12/18/2024 0953 Ready for Procedure     0957 Anesthesia Start     1137 Anesthesia Stop          Responsible Staff  12/18/24      Name Role Begin End    Barry Espinoza D.O. Anesth 0957 1137          Overtime Reason:  no overtime (within assigned shift)    Comments:

## 2024-12-18 NOTE — PROGRESS NOTES
Antepartum Progress Note    Sasha Bryant   34w5d  Admission DX: Indication for care in labor or delivery [O75.9]  Labor and delivery complications, antepartum [O75.9]    Date of Admission: 2024  Patient Active Problem List    Diagnosis Date Noted    Labor and delivery complications, antepartum 2024    History of postpartum hemorrhage 2024    History of  delivery 2024    History of sepsis 2024    History of loop electrical excision procedure (LEEP) 2024    Gestational hypertension, third trimester 12/10/2024    Fetal growth restriction antepartum 12/10/2024    Indication for care in labor or delivery 2024       Subjective:   C/o feeling dizzy, lightheaded on mag sulfate.   Denies CTX, VB or LOF. + FM.     Objective:   Vitals:    24 0500 24 0600 24 0700 24 0900   BP: 133/65 (!) 150/90 (!) 149/90 137/78   Pulse: 89 86 98 78   Resp: 15      Temp:       TempSrc:       SpO2: 93% 95% 94% 94%   Weight:       Height:         Fetal heart variability: moderate  Gen: NAD  Membranes: ruptured: no  Abdomen: gravid, soft, NT; prior Pfannensteil keloid present.   Ext: SCDs on, Nt, no cyanosis or clubbing; 2+ edema on bilateral lower extremities.     Meds:     Current Facility-Administered Medications:     sodium citrate-citric acid (Bicitra) 500-334 MG/5ML solution 30 mL, 30 mL, Oral, Once, Barry Espinoza D.O.    famotidine (Pepcid) injection 20 mg, 20 mg, Intravenous, Once, Barry Espinoza D.O.    metoclopramide (Reglan) injection 10 mg, 10 mg, Intravenous, Once, Barry Espinoza D.O.    electrolyte-A (Plasmalyte-A) (BOLUS) infusion 1,000 mL, 1,000 mL, Intravenous, Once, Barry Espinoza D.O.    lactated ringers infusion, , Intravenous, Continuous, Rich Lam M.D., Last Rate: 75 mL/hr at 24 0559, Rate Verify at 24 05    calcium GLUConate injection 1 g, 1 g, Intravenous, Once PRN, Rich Lam M.D.    [COMPLETED]  magnesium sulfate IVPB premix 4 g, 4 g, Intravenous, Once, Stopped at 12/17/24 2153 **FOLLOWED BY** magnesium sulfate 40 g/1000mL infusion, 2 g/hr, Intravenous, Continuous, Rich Lam M.D., Last Rate: 50 mL/hr at 12/18/24 0559, 2 g/hr at 12/18/24 0559    acetaminophen (Tylenol) tablet 650 mg, 650 mg, Oral, Q4HRS PRN, Gabe Platt M.D., 650 mg at 12/14/24 1253    prenatal plus vitamin (Stuartnatal 1+1) 27-1 MG tablet 1 Tablet, 1 Tablet, Oral, Daily-0800, Yaya Dang D.O., 1 Tablet at 12/17/24 0747    NIFEdipine IR (Procardia) capsule 10 mg, 10 mg, Oral, Q20MIN PRN, 10 mg at 12/17/24 1709 **OR** labetalol (Normodyne/Trandate) injection 20-80 mg, 20-80 mg, Intravenous, Q10 MIN PRN **OR** hydrALAZINE (Apresoline) injection 5-10 mg, 5-10 mg, Intravenous, Q20MIN PRN, Jacob Chavez M.D.    NIFEdipine SR (Procadia-XL) tablet 60 mg, 60 mg, Oral, Q DAY, Jacob Chavez M.D., 60 mg at 12/18/24 0533      Imaging:    Lab:   Recent Results (from the past 72 hours)   CBC WITHOUT DIFFERENTIAL    Collection Time: 12/18/24 12:08 AM   Result Value Ref Range    WBC 9.4 4.8 - 10.8 K/uL    RBC 3.94 (L) 4.20 - 5.40 M/uL    Hemoglobin 12.6 12.0 - 16.0 g/dL    Hematocrit 37.1 37.0 - 47.0 %    MCV 94.2 81.4 - 97.8 fL    MCH 32.0 27.0 - 33.0 pg    MCHC 34.0 32.2 - 35.5 g/dL    RDW 43.8 35.9 - 50.0 fL    Platelet Count 267 164 - 446 K/uL    MPV 10.0 9.0 - 12.9 fL   SERIUM MAGNESIUM LEVEL 2 HRS AFTER LOADING DOSE INFUSED    Collection Time: 12/18/24 12:08 AM   Result Value Ref Range    Magnesium 4.6 (H) 1.5 - 2.5 mg/dL   Comp Metabolic Panel    Collection Time: 12/18/24 12:08 AM   Result Value Ref Range    Sodium 132 (L) 135 - 145 mmol/L    Potassium 4.2 3.6 - 5.5 mmol/L    Chloride 105 96 - 112 mmol/L    Co2 18 (L) 20 - 33 mmol/L    Anion Gap 9.0 7.0 - 16.0    Glucose 102 (H) 65 - 99 mg/dL    Bun 14 8 - 22 mg/dL    Creatinine 0.82 0.50 - 1.40 mg/dL    Calcium 8.6 8.5 - 10.5 mg/dL    Correct Calcium 9.6 8.5 - 10.5 mg/dL     AST(SGOT) 24 12 - 45 U/L    ALT(SGPT) 10 2 - 50 U/L    Alkaline Phosphatase 131 (H) 30 - 99 U/L    Total Bilirubin <0.2 0.1 - 1.5 mg/dL    Albumin 2.8 (L) 3.2 - 4.9 g/dL    Total Protein 5.8 (L) 6.0 - 8.2 g/dL    Globulin 3.0 1.9 - 3.5 g/dL    A-G Ratio 0.9 g/dL   ESTIMATED GFR    Collection Time: 24 12:08 AM   Result Value Ref Range    GFR (CKD-EPI) 95 >60 mL/min/1.73 m 2   SERUM MAGNESIUM LEVELS EVERY 6 HRS UNTIL DESIRED RANGE (5-8 MG/DL) ACHIEVED    Collection Time: 24  6:46 AM   Result Value Ref Range    Magnesium 6.3 (H) 1.5 - 2.5 mg/dL       Assessment:  34 y.o.  @ 34w5d  Indication for care in labor or delivery [O75.9]  Labor and delivery complications, antepartum [O75.9]    Per MFM, recommended delivery due to worsening Bps with severe preeclampsia including significant proteinuria.    24hr urine protein 6762  - s/p BMZ #1 , #2   - GBS neg  US : EFW 1779g (5.6%); S/D ratio: 3.33  History of prior C/S for failure to progress. Patient desires RCS. Declines sterilization.   Risks of  section discussed with patient, including, but not limited to, bleeding, infection, damage to other organs such as bowel, bladder, ureters, and nerves, need for reoperation, injury to fetus, need for blood transfusion if indicated. Patient understands all risks and all questions answered. Consents to be signed.     Plan:  Consent on chart.   COD and type and cross 2 u.  Notify anesthesia.   Hypotension noted - mag paused, mag level being sent and IV bolus in prep for C/S.   Preop antibiotics - ancef 2g IV.   NICU notified.     To OR for repeat  section.       Luis Angel Schwartz M.D.    Obstetrics and Gynecology    2024 9:06 AM

## 2024-12-18 NOTE — PROGRESS NOTES
Name: Sasha Bryant  Medical Record Number:  [id number]  YOB: 1990  Date of Service: 2024      The patient is a 34 y.o. , with an NASIR of 2025, by Other Basis dating method.  Please see the active problem list for a full description of the issues for which the patient was evaluated for today.      She was seen today for maternal fetal medicine consultation.      Physical Examination: General appearance - alert, well appearing, and in no distress    The patient's past medical history and prenatal records were reviewed.  Additional issues addressed and updated today:  Patient Active Problem List   Diagnosis    Indication for care in labor or delivery    Gestational hypertension, third trimester    Fetal growth restriction antepartum    History of postpartum hemorrhage    History of  delivery    History of sepsis    History of loop electrical excision procedure (LEEP)    Labor and delivery complications, antepartum     History reviewed. No pertinent family history.  Social History     Socioeconomic History    Marital status:    Tobacco Use    Smoking status: Never    Smokeless tobacco: Never   Vaping Use    Vaping status: Never Used   Substance and Sexual Activity    Alcohol use: Not Currently    Drug use: Never     Social Drivers of Health     Food Insecurity: No Food Insecurity (2024)    Hunger Vital Sign     Worried About Running Out of Food in the Last Year: Never true     Ran Out of Food in the Last Year: Never true   Transportation Needs: No Transportation Needs (2024)    PRAPARE - Transportation     Lack of Transportation (Medical): No     Lack of Transportation (Non-Medical): No   Intimate Partner Violence: Not At Risk (2024)    Humiliation, Afraid, Rape, and Kick questionnaire     Fear of Current or Ex-Partner: No     Emotionally Abused: No     Physically Abused: No     Sexually Abused: No   Housing Stability: Unknown (2024)    Housing Stability  Vital Sign     Unable to Pay for Housing in the Last Year: No     Homeless in the Last Year: No       The patient has had  increasing  BP's over the last couple of days. Overnight they have been in severe range. I have recommended delivery based on the diagnosis of severe preeclampsia.  I spent significant time with the patient and her  and all her questions were answered.    Fabien Duron Jr., M.D.

## 2024-12-18 NOTE — PROGRESS NOTES
07- report received from Jordana GOLDBERG  0730- Dr Duron in room to see pt; pt has agreed to proceed with delivery via  section at this time.  08- report to Yeni GOLDBERG

## 2024-12-18 NOTE — PROGRESS NOTES
- Report received from ASHLYN Jeffers   - Assessment completed. Pt denies any h/a, blurred vision, or epigastric pain. Reports good fm. Monitors x2 applied   - POC reviewed with Dr. Lam. Orders received to keep pt NPO after MN incase pt needs to have a  in the am and begin LR at 125 ml/hr   - pt notified of above and pt and S.O. with many questions and concerns. States this has been the POC with other Mds. All questions answered and pt would like to discuss with MD in the morning   - Dr Lam at bedside to discuss POC. MD would like to  now. Risks/benefits discussed. Pt and S.O. to discuss with each other and let us know what they decide.   - Dr Lam at bedside and questions answered. Pt desires to speak with Dr Duron in the am before proceeding with c/s. Per Dr Lam, we will draw labwork to evaluate liver and kidney function and begin mag sulfate infusion. If BP does not stabilize or if labwork shows marked increase in liver function then c/s may be done tonight. Pt and S.O. are in agreement with POC   - Orders for mag sulfate infusion received. Per Dr Lam pt may be up to void instead of placing a carrasco catheter and will do NST Q shift, no continuous fetal monitoring required at this time  55 - Dr Lam notified of lab results and current BP readings. PCR canceled by MD  07 - Report given to ASHLYN Vásquez

## 2024-12-18 NOTE — L&D DELIVERY NOTE
Delivery Note    Obstetrician:  Luis Angel Schwartz MD    Assistant: Vickey Oh MD    Pre-Delivery Diagnosis: 33 yo  @ 34 5/7 intrauterine pregnancy  Severe preeclampsia  History of prior  section    Post-Delivery Diagnosis: Living  infant(s) or Female    Procedure: Repeat Low Transverse  section     Episiotomy or Incision: Pfannensteil    Indications for instrumental delivery: none    Infant Wt: Pending    Apgars: 1' 8  /  5' 9     Placenta and Cord:          Mechanism: Manual with adherence to the fundus of uterus.  Placenta accreta      Description:  complete, 3 vessel cord    Estimated Blood Loss:  2000 ml           Total IV Fluids: 1500 ml           Specimens: Placenta to path           Complications:  Uterine atony, acute blood loss.     Meds: IV pitocin, TXA, hemabate, cytotec, B johnson. See MAR for doses.            Condition: stable    Blood Type and Rh: O pos, negative      Rubella Immunity Status:   Immune      See operative report for full details.     Luis Angel Schwartz M.D.    Obstetrics and Gynecology    2024 11:38 AM

## 2024-12-18 NOTE — PROGRESS NOTES
2100: Report received from ASHLYN Silveira. POC discussed. Care assumed for Mag start.    2230: Report given to ASHLYN Silveira. Pt resting comfortably at this time. Care relinquished.

## 2024-12-18 NOTE — CARE PLAN
Problem: Cardiac Output  Goal: Patient will remain normotensive throughout hospitalization  Outcome: Not Progressing  Note: BP elevated. Will medicate per orders as needed and will begin mag sulfate infusion per orders   The patient is Watcher - Medium risk of patient condition declining or worsening    Shift Goals  Clinical Goals: monitor BP, healthy mom and healthy baby  Patient Goals: rest, stabilize BP, stay pregnant  Family Goals: support    Progress made toward(s) clinical / shift goals:  FHR reassuring. Possible  tonight or in the morning    Patient is not progressing towards the following goals:BP stabilization      Problem: Cardiac Output  Goal: Patient will remain normotensive throughout hospitalization  Outcome: Not Progressing  Note: BP elevated. Will medicate per orders as needed and will begin mag sulfate infusion per orders

## 2024-12-19 LAB
ALBUMIN SERPL BCP-MCNC: 2.4 G/DL (ref 3.2–4.9)
ALBUMIN SERPL BCP-MCNC: 2.6 G/DL (ref 3.2–4.9)
ALBUMIN/GLOB SERPL: 1 G/DL
ALBUMIN/GLOB SERPL: 1.1 G/DL
ALP SERPL-CCNC: 111 U/L (ref 30–99)
ALP SERPL-CCNC: 99 U/L (ref 30–99)
ALT SERPL-CCNC: 15 U/L (ref 2–50)
ALT SERPL-CCNC: 15 U/L (ref 2–50)
ANION GAP SERPL CALC-SCNC: 10 MMOL/L (ref 7–16)
ANION GAP SERPL CALC-SCNC: 9 MMOL/L (ref 7–16)
AST SERPL-CCNC: 25 U/L (ref 12–45)
AST SERPL-CCNC: 32 U/L (ref 12–45)
BILIRUB SERPL-MCNC: 0.2 MG/DL (ref 0.1–1.5)
BILIRUB SERPL-MCNC: 0.2 MG/DL (ref 0.1–1.5)
BUN SERPL-MCNC: 14 MG/DL (ref 8–22)
BUN SERPL-MCNC: 15 MG/DL (ref 8–22)
CALCIUM ALBUM COR SERPL-MCNC: 8.2 MG/DL (ref 8.5–10.5)
CALCIUM ALBUM COR SERPL-MCNC: 8.4 MG/DL (ref 8.5–10.5)
CALCIUM SERPL-MCNC: 6.9 MG/DL (ref 8.5–10.5)
CALCIUM SERPL-MCNC: 7.3 MG/DL (ref 8.5–10.5)
CHLORIDE SERPL-SCNC: 98 MMOL/L (ref 96–112)
CHLORIDE SERPL-SCNC: 98 MMOL/L (ref 96–112)
CO2 SERPL-SCNC: 19 MMOL/L (ref 20–33)
CO2 SERPL-SCNC: 20 MMOL/L (ref 20–33)
CREAT SERPL-MCNC: 0.78 MG/DL (ref 0.5–1.4)
CREAT SERPL-MCNC: 0.84 MG/DL (ref 0.5–1.4)
ERYTHROCYTE [DISTWIDTH] IN BLOOD BY AUTOMATED COUNT: 51.6 FL (ref 35.9–50)
ERYTHROCYTE [DISTWIDTH] IN BLOOD BY AUTOMATED COUNT: 52.2 FL (ref 35.9–50)
GFR SERPLBLD CREATININE-BSD FMLA CKD-EPI: 101 ML/MIN/1.73 M 2
GFR SERPLBLD CREATININE-BSD FMLA CKD-EPI: 93 ML/MIN/1.73 M 2
GLOBULIN SER CALC-MCNC: 2.2 G/DL (ref 1.9–3.5)
GLOBULIN SER CALC-MCNC: 2.7 G/DL (ref 1.9–3.5)
GLUCOSE SERPL-MCNC: 79 MG/DL (ref 65–99)
GLUCOSE SERPL-MCNC: 85 MG/DL (ref 65–99)
HCT VFR BLD AUTO: 30.4 % (ref 37–47)
HCT VFR BLD AUTO: 34.1 % (ref 37–47)
HGB BLD-MCNC: 10.4 G/DL (ref 12–16)
HGB BLD-MCNC: 11.8 G/DL (ref 12–16)
MAGNESIUM SERPL-MCNC: 6.7 MG/DL (ref 1.5–2.5)
MAGNESIUM SERPL-MCNC: 7.1 MG/DL (ref 1.5–2.5)
MCH RBC QN AUTO: 31 PG (ref 27–33)
MCH RBC QN AUTO: 31.1 PG (ref 27–33)
MCHC RBC AUTO-ENTMCNC: 34.2 G/DL (ref 32.2–35.5)
MCHC RBC AUTO-ENTMCNC: 34.6 G/DL (ref 32.2–35.5)
MCV RBC AUTO: 89.7 FL (ref 81.4–97.8)
MCV RBC AUTO: 90.7 FL (ref 81.4–97.8)
PATHOLOGY CONSULT NOTE: NORMAL
PLATELET # BLD AUTO: 175 K/UL (ref 164–446)
PLATELET # BLD AUTO: 186 K/UL (ref 164–446)
PMV BLD AUTO: 10 FL (ref 9–12.9)
PMV BLD AUTO: 10 FL (ref 9–12.9)
POTASSIUM SERPL-SCNC: 4.8 MMOL/L (ref 3.6–5.5)
POTASSIUM SERPL-SCNC: 5.1 MMOL/L (ref 3.6–5.5)
PROT SERPL-MCNC: 4.6 G/DL (ref 6–8.2)
PROT SERPL-MCNC: 5.3 G/DL (ref 6–8.2)
RBC # BLD AUTO: 3.35 M/UL (ref 4.2–5.4)
RBC # BLD AUTO: 3.8 M/UL (ref 4.2–5.4)
SODIUM SERPL-SCNC: 127 MMOL/L (ref 135–145)
SODIUM SERPL-SCNC: 127 MMOL/L (ref 135–145)
WBC # BLD AUTO: 16.1 K/UL (ref 4.8–10.8)
WBC # BLD AUTO: 16.4 K/UL (ref 4.8–10.8)

## 2024-12-19 PROCEDURE — 700102 HCHG RX REV CODE 250 W/ 637 OVERRIDE(OP): Performed by: OBSTETRICS & GYNECOLOGY

## 2024-12-19 PROCEDURE — 85027 COMPLETE CBC AUTOMATED: CPT | Mod: 91

## 2024-12-19 PROCEDURE — 700102 HCHG RX REV CODE 250 W/ 637 OVERRIDE(OP): Performed by: STUDENT IN AN ORGANIZED HEALTH CARE EDUCATION/TRAINING PROGRAM

## 2024-12-19 PROCEDURE — A9270 NON-COVERED ITEM OR SERVICE: HCPCS | Performed by: OBSTETRICS & GYNECOLOGY

## 2024-12-19 PROCEDURE — 36415 COLL VENOUS BLD VENIPUNCTURE: CPT

## 2024-12-19 PROCEDURE — 80053 COMPREHEN METABOLIC PANEL: CPT | Mod: 91

## 2024-12-19 PROCEDURE — 700111 HCHG RX REV CODE 636 W/ 250 OVERRIDE (IP): Mod: JZ | Performed by: ANESTHESIOLOGY

## 2024-12-19 PROCEDURE — A9270 NON-COVERED ITEM OR SERVICE: HCPCS | Performed by: STUDENT IN AN ORGANIZED HEALTH CARE EDUCATION/TRAINING PROGRAM

## 2024-12-19 PROCEDURE — A9270 NON-COVERED ITEM OR SERVICE: HCPCS | Performed by: ANESTHESIOLOGY

## 2024-12-19 PROCEDURE — 770002 HCHG ROOM/CARE - OB PRIVATE (112)

## 2024-12-19 PROCEDURE — 700102 HCHG RX REV CODE 250 W/ 637 OVERRIDE(OP): Performed by: ANESTHESIOLOGY

## 2024-12-19 PROCEDURE — 83735 ASSAY OF MAGNESIUM: CPT

## 2024-12-19 RX ORDER — SIMETHICONE 125 MG
125 TABLET,CHEWABLE ORAL 3 TIMES DAILY PRN
Status: DISCONTINUED | OUTPATIENT
Start: 2024-12-19 | End: 2024-12-21 | Stop reason: HOSPADM

## 2024-12-19 RX ADMIN — ACETAMINOPHEN 1000 MG: 500 TABLET, FILM COATED ORAL at 03:21

## 2024-12-19 RX ADMIN — ACETAMINOPHEN 1000 MG: 500 TABLET ORAL at 15:41

## 2024-12-19 RX ADMIN — KETOROLAC TROMETHAMINE 15 MG: 15 INJECTION, SOLUTION INTRAMUSCULAR; INTRAVENOUS at 03:22

## 2024-12-19 RX ADMIN — NIFEDIPINE 30 MG: 30 TABLET, FILM COATED, EXTENDED RELEASE ORAL at 05:49

## 2024-12-19 RX ADMIN — KETOROLAC TROMETHAMINE 15 MG: 15 INJECTION, SOLUTION INTRAMUSCULAR; INTRAVENOUS at 09:27

## 2024-12-19 RX ADMIN — PRENATAL WITH FERROUS FUM AND FOLIC ACID 1 TABLET: 3080; 920; 120; 400; 22; 1.84; 3; 20; 10; 1; 12; 200; 27; 25; 2 TABLET ORAL at 08:38

## 2024-12-19 RX ADMIN — OXYCODONE HYDROCHLORIDE 10 MG: 10 TABLET ORAL at 19:42

## 2024-12-19 RX ADMIN — ACETAMINOPHEN 1000 MG: 500 TABLET, FILM COATED ORAL at 08:38

## 2024-12-19 RX ADMIN — OXYCODONE 5 MG: 5 TABLET ORAL at 15:46

## 2024-12-19 RX ADMIN — SIMETHICONE 125 MG: 125 TABLET, CHEWABLE ORAL at 17:02

## 2024-12-19 RX ADMIN — DOCUSATE SODIUM 100 MG: 100 CAPSULE, LIQUID FILLED ORAL at 17:02

## 2024-12-19 ASSESSMENT — PAIN DESCRIPTION - PAIN TYPE
TYPE: SURGICAL PAIN
TYPE: ACUTE PAIN;SURGICAL PAIN
TYPE: ACUTE PAIN
TYPE: SURGICAL PAIN
TYPE: ACUTE PAIN;SURGICAL PAIN
TYPE: SURGICAL PAIN
TYPE: ACUTE PAIN

## 2024-12-19 ASSESSMENT — EDINBURGH POSTNATAL DEPRESSION SCALE (EPDS)
I HAVE BEEN ANXIOUS OR WORRIED FOR NO GOOD REASON: YES, SOMETIMES
I HAVE BLAMED MYSELF UNNECESSARILY WHEN THINGS WENT WRONG: YES, SOME OF THE TIME
I HAVE FELT SCARED OR PANICKY FOR NO GOOD REASON: NO, NOT AT ALL
I HAVE LOOKED FORWARD WITH ENJOYMENT TO THINGS: AS MUCH AS I EVER DID
I HAVE BEEN SO UNHAPPY THAT I HAVE BEEN CRYING: NO, NEVER
I HAVE FELT SAD OR MISERABLE: NO, NOT AT ALL
I HAVE BEEN SO UNHAPPY THAT I HAVE HAD DIFFICULTY SLEEPING: NOT AT ALL
THE THOUGHT OF HARMING MYSELF HAS OCCURRED TO ME: NEVER
I HAVE BEEN ABLE TO LAUGH AND SEE THE FUNNY SIDE OF THINGS: AS MUCH AS I ALWAYS COULD
THINGS HAVE BEEN GETTING ON TOP OF ME: NO, I HAVE BEEN COPING AS WELL AS EVER

## 2024-12-19 NOTE — CARE PLAN
The patient is Watcher - Medium risk of patient condition declining or worsening    Shift Goals  Clinical Goals: VSS and monitor I&O's   Patient Goals: Visit baby in NICU  Family Goals: Support    Progress made toward(s) clinical / shift goals:    Problem: Knowledge Deficit - L&D  Goal: Patient and family/caregivers will demonstrate understanding of plan of care, disease process/condition, diagnostic tests and medications  Outcome: Progressing  Note: Patient encouraged to voice feelings and ask questions.     Problem: Pain  Goal: Patient's pain will be alleviated or reduced to the patient’s comfort goal  Outcome: Progressing  Note: Patient encouraged to voice changes in pain.

## 2024-12-19 NOTE — PROGRESS NOTES
1900 - Report received from ASHLYN Weinstein  1945 - Mag restarted at 2gm/hr per orders. VSS. BBS clear. DTR's present. Pt denies any h/a, blurred vision, or epigastric pain. Fundus firm. No vag bleeding noted on pad  0308 - PC to Dr Efren MD notified of critical calcium level 6.9 and urine output averaging around 40ml/hr. No orders received  0550 - pt reports passing gas, diet advanced  0700 - Report given to ASHLYN Ramirez

## 2024-12-19 NOTE — LACTATION NOTE
Sasha is a 34 year old . She delivered via C/S on  at 34.5 weeks. Her daughter is currently in the NICU.     At time of visit, Sasha was pumping. She was on a suction of 65, (she states this was  comfortable), she had lowered her rate from 80 to 60 after 2 minutes and was collecting drops of colostrum which were being taken to the NICU via swabs and syringes.     Sasha will begin to follow a pumping schedule of q3 hours. Currently the standard flange size appears appropriate. She may need reassessment for larger flange on right breast if rubbing occurs.     MOB reassured of continuing support.    1200 Pumping supplies re-stocked and scent clothes given.

## 2024-12-19 NOTE — CARE PLAN
Problem: Cardiac Output  Goal: Patient will remain normotensive throughout hospitalization  Outcome: Progressing  Note: BP stabilizing. Mag sulfate infusing and Procardia to be given per orders     Problem: Pain  Goal: Patient's pain will be alleviated or reduced to the patient’s comfort goal  Outcome: Progressing  Note: Pt reports mild pain around incision. Discussed pain med regimen and that there are other options if needed   The patient is Watcher - Medium risk of patient condition declining or worsening    Shift Goals  Clinical Goals: Prevent seizure. Monitor Vs and vag bleeding  Patient Goals: see baby in NICU  Family Goals: support    Progress made toward(s) clinical / shift goals:  mag sulfate infusing per orders, BP WDL currently. Fundus firm with no vag bleeding noted    Patient is not progressing towards the following goals:n/a

## 2024-12-19 NOTE — PROGRESS NOTES
Transferred from labor and delivery via wheelchair. Ambulated to bed without difficulty. Ff u/1 lochia scant rubra. Oriented to room and policies ,procedures . Oriented to breast pump. Was already pumping in labor and delivery . Understands pump settings

## 2024-12-19 NOTE — PROGRESS NOTES
"POSTOP EVALUATION    Subjective:  Patient has no complaints. Denies significant pain, nausea, vomiting, headache, visual changes or RUQ pain.   Minimal to no vaginal bleeding on fundal checks.     Objective:  /86   Pulse 94   Temp 36.3 °C (97.4 °F) (Temporal)   Resp 20   Ht 1.6 m (5' 3\")   Wt 81.6 kg (179 lb 14.3 oz)   SpO2 95%   Breastfeeding Unknown   BMI 31.87 kg/m²   NAD  Chest unlabored  Abdomen soft NDNT fundus firm at umbilicus. Dressing c/d/I.     Assessment and Plan:  Sasha Bryant is a  s/p RCS at 34 5/7 POD #0 due to severe preeclampsia, hx prior c/s with EBL 2000 ml. Adherent placenta.  S/p 2 u PRBCs. Mag sulfate held postop due to bleeding risk.     May restart mag this PM for 24h post delivery for seizure prevention.   Advanced diet as tolerated this evening.   Pumping breast milk for infant in NICU.   Continue to monitor Bps and bleeding closely.   F/u post transfusion CBC.   Continue postop care.     Luis Angel Schwartz M.D.   "

## 2024-12-19 NOTE — PROGRESS NOTES
SECTION POSTPARTUM PROGRESS NOTE    PATIENT ID:  NAME:  Sasha Bryant  MRN:               [id number]  YOB: 1990     34 y.o. female  at 34w5d POD#1 s/p RCS c/b placenta acreta, EBL 2000ml.  S/p 2u PRBCS.      Subjective: Patient reports she is well this morning.  She is tolerating PO without significant nausea or vomiting, she is pumping breast milk for baby in NICU, voiding, and lochia is light.  Passing flatus She ambulating without presyncopal symptoms and her pain is well controlled. Lomeli in place draining clear urine. She denies headache, visual changes, RUQ pain, shortness of breath, fever/chills, urinary symptoms, or vaginal bleeding.     Objective:    Vitals:    24 0414 24 0500 24 0600 24 0700   BP: 120/77 134/81 118/76 117/75   Pulse: 74 74 72 90   Resp: 16      Temp: 36.3 °C (97.3 °F)      TempSrc: Temporal      SpO2: 95% 94% 93% 94%   Weight:       Height:         General: No acute distress, resting comfortably in bed.  HEENT: normocephalic, nontraumatic, PERRLA, EOMI  Cardiovascular: Heart RRR with no murmurs, rubs or gallops. Distal Pulses 2+  Respiratory: symmetric chest expansion, lungs CTA bilaterally with no wheezes rales or rhonci  Abdomen: soft, mildly tender around incision which is clean, dry and intact, fundus firm below umbilicus, +BS  Genitourinary: lochia light, denies excessive vaginal bleeding  Musculoskeletal: strength 5/5 in four extremities  Neuro: non focal with no numbness, tingling or changes in sensation      Recent Labs     24  1100 24  1708 24  0152   WBC 10.9* 23.6* 16.4*   RBC 3.16* 4.00* 3.35*   HEMOGLOBIN 10.1* 12.6 10.4*   HEMATOCRIT 29.6* 36.9* 30.4*   MCV 93.7 92.3 90.7   MCH 32.0 31.5 31.0   RDW 44.1 52.0* 52.2*   PLATELETCT 262 205 175   MPV 10.0 10.0 10.0     Recent Labs     24  0008 24  0152   SODIUM 132* 127*   POTASSIUM 4.2 5.1   CHLORIDE 105 98   CO2 18* 19*   GLUCOSE 102* 85   BUN  14 15       Current Meds:   Current Facility-Administered Medications   Medication Dose Frequency Provider Last Rate Last Admin    lactated ringers infusion   Continuous STEVAN ManriqueOSully 50 mL/hr at 12/18/24 1253 New Bag at 12/18/24 1253    acetaminophen (Tylenol) tablet 1,000 mg  1,000 mg Q6HR HAYDEN Manrique.O.   1,000 mg at 12/19/24 0321    ketorolac (Toradol) 15 MG/ML injection 15 mg  15 mg Q6HR HAYDEN Manrique.O.   15 mg at 12/19/24 0322    oxyCODONE immediate-release (Roxicodone) tablet 5 mg  5 mg Q4HRS PRN STEVAN ManriqueOSully        oxyCODONE immediate release (Roxicodone) tablet 10 mg  10 mg Q4HRS PRN HAYDEN Manrique.O.        HYDROmorphone (Dilaudid) injection 0.2 mg  0.2 mg Q2HRS PRN HAYDEN Manrique.O.        HYDROmorphone (Dilaudid) injection 0.4 mg  0.4 mg Q2HRS PRN HAYDEN Manrique.O.        ePHEDrine injection 10 mg  10 mg Q5 MIN PRN HAYDEN Manrique.O.        ondansetron (Zofran) syringe/vial injection 4 mg  4 mg Q6HRS PRN HAYDEN Manrique.O.        diphenhydrAMINE (Benadryl) injection 12.5 mg  12.5 mg Q6HRS PRN HAYDEN Manrique.O.        naloxone HCl (Narcan) 20 mg in  mL infusion  0.25 mcg/kg/hr PRN STEVAN ManriqueO.        diphenhydrAMINE (Benadryl) injection 12.5 mg  12.5 mg Q6HRS PRN HAYDEN Manrique.O.        Or    diphenhydrAMINE (Benadryl) injection 25 mg  25 mg Q6HRS PRN STEVAN ManriqueO.        Or    naloxone HCl (Narcan) 20 mg in  mL infusion  0.4 mg/hr Q6HRS PRN Barry Espinoza D.O.        carboPROST (Hemabate) injection 250 mcg  250 mcg Once Luis Angel Schwratz M.D.        NIFEdipine SR (Procadia-XL) tablet 30 mg  30 mg DAILY Luis Angel Schwartz M.D.   30 mg at 12/19/24 0549    lactated ringers infusion  2,000 mL PRN Luis Angel Schwartz M.D.        acetaminophen (Tylenol) tablet 1,000 mg  1,000 mg Q6HRS Luis Angel Schwartz M.D.   1,000 mg at 12/18/24 1552    Followed by    [START ON 12/22/2024] acetaminophen (Tylenol) tablet 1,000 mg  1,000 mg  Q6HRS PRN Luis Angel Schwartz M.D.        oxyCODONE immediate-release (Roxicodone) tablet 5 mg  5 mg Q4HRS PRN Luis Angel Schwartz M.D.        oxyCODONE immediate release (Roxicodone) tablet 10 mg  10 mg Q4HRS PRN Luis Angel Schwartz M.D.        ondansetron (Zofran) syringe/vial injection 4 mg  4 mg Q6HRS PRN Luis Angel Schwartz M.D.        Or    ondansetron (Zofran ODT) dispertab 4 mg  4 mg Q6HRS PRN Luis Angel Schwartz M.D.        diphenhydrAMINE (Benadryl) tablet/capsule 25 mg  25 mg Q6HRS PRN Luis Angel Schwartz M.D.        Or    diphenhydrAMINE (Benadryl) injection 25 mg  25 mg Q6HRS PRN Luis Angel Schwartz M.D.        docusate sodium (Colace) capsule 100 mg  100 mg BID PRN Luis Angel Schwartz M.D.        labetalol (Normodyne/Trandate) injection 10 mg  10 mg Q10 MIN PRN Luis Angel Schwartz M.D.        magnesium sulfate 40 g/1000mL infusion  2 g/hr Continuous Rich Lam M.D. 50 mL/hr at 24 0633 2 g/hr at 24 0633    acetaminophen (Tylenol) tablet 650 mg  650 mg Q4HRS PRN Gabe Platt M.D.   650 mg at 24 1253    prenatal plus vitamin (Stuartnatal 1+1) 27-1 MG tablet 1 Tablet  1 Tablet Daily-0800 Yaya Dang D.O.   1 Tablet at 24 0747   Last reviewed on 2024 12:11 PM by Yeni Garrett R.N.         Assessment/Plan:    34 y.o.  is s/p RCS POD #1 for severe preeclampsia, c/b placenta accretta, EBL 2000ml, medically managed intra-op. S/p 2 u PRBCs.      Doing well  #Continue routine post partum care/postop.    ?Pain control: Continue narcotic, Tylenol PRN.   ?DVT prophylaxis: ambulate as much as possible, SCDs while in bed.  --infant feeding: pumping breast milk for baby in NICU.   -DC danilo today after mag.   - Severe preeclampsia - Bps normal, plan to DC mag sulfate at 24h post delivery and continue monitoring of Bps.  Repeat labs this AM. F/u hypocalcemia and hyponatremia. Continue procardia 30 mg XL daily.     # well being - baby in NICU  ?continue breast pumping          Luis Angel Schwartz M.D.    Obstetrics and  Gynecology    12/19/2024 7:26 AM

## 2024-12-20 LAB
ALBUMIN SERPL BCP-MCNC: 2.7 G/DL (ref 3.2–4.9)
ALBUMIN/GLOB SERPL: 1 G/DL
ALP SERPL-CCNC: 110 U/L (ref 30–99)
ALT SERPL-CCNC: 13 U/L (ref 2–50)
ANION GAP SERPL CALC-SCNC: 11 MMOL/L (ref 7–16)
AST SERPL-CCNC: 21 U/L (ref 12–45)
BILIRUB SERPL-MCNC: <0.2 MG/DL (ref 0.1–1.5)
BUN SERPL-MCNC: 13 MG/DL (ref 8–22)
CALCIUM ALBUM COR SERPL-MCNC: 8.5 MG/DL (ref 8.5–10.5)
CALCIUM SERPL-MCNC: 7.5 MG/DL (ref 8.5–10.5)
CHLORIDE SERPL-SCNC: 99 MMOL/L (ref 96–112)
CO2 SERPL-SCNC: 21 MMOL/L (ref 20–33)
CREAT SERPL-MCNC: 0.7 MG/DL (ref 0.5–1.4)
GFR SERPLBLD CREATININE-BSD FMLA CKD-EPI: 115 ML/MIN/1.73 M 2
GLOBULIN SER CALC-MCNC: 2.8 G/DL (ref 1.9–3.5)
GLUCOSE SERPL-MCNC: 97 MG/DL (ref 65–99)
MAGNESIUM SERPL-MCNC: 3.2 MG/DL (ref 1.5–2.5)
POTASSIUM SERPL-SCNC: 4.2 MMOL/L (ref 3.6–5.5)
PROT SERPL-MCNC: 5.5 G/DL (ref 6–8.2)
SODIUM SERPL-SCNC: 131 MMOL/L (ref 135–145)

## 2024-12-20 PROCEDURE — A9270 NON-COVERED ITEM OR SERVICE: HCPCS | Performed by: OBSTETRICS & GYNECOLOGY

## 2024-12-20 PROCEDURE — 36415 COLL VENOUS BLD VENIPUNCTURE: CPT

## 2024-12-20 PROCEDURE — 700102 HCHG RX REV CODE 250 W/ 637 OVERRIDE(OP): Performed by: OBSTETRICS & GYNECOLOGY

## 2024-12-20 PROCEDURE — A9270 NON-COVERED ITEM OR SERVICE: HCPCS | Performed by: STUDENT IN AN ORGANIZED HEALTH CARE EDUCATION/TRAINING PROGRAM

## 2024-12-20 PROCEDURE — 770002 HCHG ROOM/CARE - OB PRIVATE (112)

## 2024-12-20 PROCEDURE — 80053 COMPREHEN METABOLIC PANEL: CPT

## 2024-12-20 PROCEDURE — 83735 ASSAY OF MAGNESIUM: CPT

## 2024-12-20 PROCEDURE — 700102 HCHG RX REV CODE 250 W/ 637 OVERRIDE(OP): Performed by: STUDENT IN AN ORGANIZED HEALTH CARE EDUCATION/TRAINING PROGRAM

## 2024-12-20 RX ADMIN — SIMETHICONE 125 MG: 125 TABLET, CHEWABLE ORAL at 10:06

## 2024-12-20 RX ADMIN — ACETAMINOPHEN 1000 MG: 500 TABLET ORAL at 06:05

## 2024-12-20 RX ADMIN — OXYCODONE 5 MG: 5 TABLET ORAL at 21:33

## 2024-12-20 RX ADMIN — ACETAMINOPHEN 500 MG: 500 TABLET ORAL at 12:27

## 2024-12-20 RX ADMIN — ACETAMINOPHEN 1000 MG: 500 TABLET ORAL at 18:34

## 2024-12-20 RX ADMIN — ACETAMINOPHEN 1000 MG: 500 TABLET ORAL at 00:15

## 2024-12-20 RX ADMIN — NIFEDIPINE 30 MG: 30 TABLET, FILM COATED, EXTENDED RELEASE ORAL at 06:05

## 2024-12-20 RX ADMIN — SIMETHICONE 125 MG: 125 TABLET, CHEWABLE ORAL at 00:15

## 2024-12-20 RX ADMIN — OXYCODONE 5 MG: 5 TABLET ORAL at 17:09

## 2024-12-20 RX ADMIN — PRENATAL WITH FERROUS FUM AND FOLIC ACID 1 TABLET: 3080; 920; 120; 400; 22; 1.84; 3; 20; 10; 1; 12; 200; 27; 25; 2 TABLET ORAL at 10:07

## 2024-12-20 RX ADMIN — OXYCODONE HYDROCHLORIDE 10 MG: 10 TABLET ORAL at 00:18

## 2024-12-20 RX ADMIN — OXYCODONE 5 MG: 5 TABLET ORAL at 10:07

## 2024-12-20 RX ADMIN — OXYCODONE 5 MG: 5 TABLET ORAL at 06:07

## 2024-12-20 RX ADMIN — DOCUSATE SODIUM 100 MG: 100 CAPSULE, LIQUID FILLED ORAL at 17:07

## 2024-12-20 ASSESSMENT — PAIN DESCRIPTION - PAIN TYPE
TYPE: ACUTE PAIN;SURGICAL PAIN
TYPE: ACUTE PAIN;SURGICAL PAIN
TYPE: ACUTE PAIN
TYPE: SURGICAL PAIN
TYPE: ACUTE PAIN;SURGICAL PAIN
TYPE: ACUTE PAIN;SURGICAL PAIN

## 2024-12-20 NOTE — PROGRESS NOTES
"Postpartum Progress Note    Sasha Bryant is a 34 y.o. now  on post-op day #2 following  rLTCS c/b placenta acreta, EBL 2000ml.  S/p 2u PRBCS.     Subjective:   Pt reports overall feeling well, but continued moderate pain. Describes having \"gassy\" pain/discomfort  Patient is meeting postpartum milestones.   Pain is moderately controlled with Tylenol, toradol, and Oxycodone pain medication.    Pt is ambulating independently.   Pt has  voided spontaneously.  Pt has  passed gas.  Pt has not had bowel movement.  Pt is  tolerating oral intake.    Lochia is light.  Denies Headache, CP, SOB, vision changes, leg swelling    Infant is feeding via MBM pumping for baby in NICU, reports this is going well.    Nursing concerns:  none    OBJECTIVE:    Patient Vitals for the past 24 hrs:   BP Temp Temp src Pulse Resp SpO2   24 0528 133/87 36.4 °C (97.6 °F) Temporal 71 18 93 %   24 0042 134/88 36.9 °C (98.5 °F) Temporal 79 18 97 %   24 2200 (!) 131/90 36.8 °C (98.2 °F) Temporal 82 18 96 %   24 1800 121/81 37.7 °C (99.9 °F) Temporal 82 18 94 %   24 1318 127/85 37.1 °C (98.8 °F) Temporal 93 18 93 %   24 1101 123/74 37.3 °C (99.1 °F) Temporal 87 17 95 %   24 1048 -- -- -- 86 -- 96 %   24 1045 123/74 -- -- 77 -- 92 %   24 1038 -- -- -- 83 -- 91 %   24 1000 126/80 -- -- 80 -- 91 %   24 0945 -- -- -- 82 -- 93 %   24 0930 -- -- -- 80 -- 93 %   24 0900 122/57 -- -- 91 -- 94 %   24 0812 129/72 37.1 °C (98.8 °F) Temporal 92 18 94 %   24 0800 131/86 -- -- 95 -- 93 %   24 0700 117/75 36.6 °C (97.9 °F) Temporal 90 16 94 %       Physical Exam:  Gen: NAD, Alert, conversant  CV: +S1S2, RRR, cap refill <2 sec, no BLE edema  Resp: CTAB, breathing comfortably on room air  Abd: soft, ND, CS incision C/D/I, mild tenderness diffusely, worse around incision and right side, no rebound/guarding, +BS;   : fundus palpable below umbilicus, lochia is " light,   Ext: moving all extremities    Meds:   Current Facility-Administered Medications:     simethicone (Mylicon) chewable tablet 125 mg, 125 mg, Oral, TID PRN, Rakel Malhotra M.D., 125 mg at 12/20/24 0015    lactated ringers infusion, , Intravenous, Continuous, Barry Espinoza D.O., Last Rate: 50 mL/hr at 12/18/24 1253, New Bag at 12/18/24 1253    NIFEdipine SR (Procadia-XL) tablet 30 mg, 30 mg, Oral, DAILY, Luis Angel Schwartz M.D., 30 mg at 12/19/24 0549    lactated ringers infusion, 2,000 mL, Intravenous, PRN, Luis Angel Schwartz M.D., Stopped at 12/19/24 1028    acetaminophen (Tylenol) tablet 1,000 mg, 1,000 mg, Oral, Q6HRS, 1,000 mg at 12/20/24 0015 **FOLLOWED BY** [START ON 12/22/2024] acetaminophen (Tylenol) tablet 1,000 mg, 1,000 mg, Oral, Q6HRS PRN, Luis Angel Schwartz M.D.    oxyCODONE immediate-release (Roxicodone) tablet 5 mg, 5 mg, Oral, Q4HRS PRN, Luis Angel Schwartz M.D., 5 mg at 12/19/24 1546    oxyCODONE immediate release (Roxicodone) tablet 10 mg, 10 mg, Oral, Q4HRS PRN, Luis Angel Schwartz M.D., 10 mg at 12/20/24 0018    ondansetron (Zofran) syringe/vial injection 4 mg, 4 mg, Intravenous, Q6HRS PRN **OR** ondansetron (Zofran ODT) dispertab 4 mg, 4 mg, Oral, Q6HRS PRN, Luis Angel Schwartz M.D.    diphenhydrAMINE (Benadryl) tablet/capsule 25 mg, 25 mg, Oral, Q6HRS PRN **OR** diphenhydrAMINE (Benadryl) injection 25 mg, 25 mg, Intravenous, Q6HRS PRN, Luis Angel Schwartz M.D.    docusate sodium (Colace) capsule 100 mg, 100 mg, Oral, BID PRN, Luis Angel Schwartz M.D., 100 mg at 12/19/24 1702    labetalol (Normodyne/Trandate) injection 10 mg, 10 mg, Intravenous, Q10 MIN PRN, Luis Angel Schwartz M.D.    acetaminophen (Tylenol) tablet 650 mg, 650 mg, Oral, Q4HRS PRN, Gabe Platt M.D., 650 mg at 12/14/24 1253    prenatal plus vitamin (Stuartnatal 1+1) 27-1 MG tablet 1 Tablet, 1 Tablet, Oral, Daily-0800, Yaya Dang D.O., 1 Tablet at 12/19/24 0838    Lab:   Recent Results (from the past 48 hours)   SERUM MAGNESIUM LEVELS EVERY 6 HRS UNTIL DESIRED RANGE (5-8  MG/DL) ACHIEVED    Collection Time: 12/18/24  6:46 AM   Result Value Ref Range    Magnesium 6.3 (H) 1.5 - 2.5 mg/dL   COD - Adult (Type and Screen)    Collection Time: 12/18/24  9:00 AM   Result Value Ref Range    ABO Grouping Only O     Rh Grouping Only POS     Antibody Screen-Cod NEG     Component R       R99                 Red Cells, LR       [id number]   transfused   12/18/24   10:56      Product Type R99     Dispense Status transfused     Unit Number (Barcoded) [id number]     Product Code (Barcoded) U6797O57     Blood Type (Barcoded) 5100     Component R       R99                 Red Cells, LR       [id number]   transfused   12/18/24   10:56      Product Type R99     Dispense Status transfused     Unit Number (Barcoded) [id number]     Product Code (Barcoded) [id number]     Blood Type (Barcoded) 5100    MAGNESIUM    Collection Time: 12/18/24  9:18 AM   Result Value Ref Range    Magnesium 6.7 (H) 1.5 - 2.5 mg/dL   Histology Request    Collection Time: 12/18/24 10:30 AM   Result Value Ref Range    Pathology Request Sent to Histo    CBC WITHOUT DIFFERENTIAL    Collection Time: 12/18/24 11:00 AM   Result Value Ref Range    WBC 10.9 (H) 4.8 - 10.8 K/uL    RBC 3.16 (L) 4.20 - 5.40 M/uL    Hemoglobin 10.1 (L) 12.0 - 16.0 g/dL    Hematocrit 29.6 (L) 37.0 - 47.0 %    MCV 93.7 81.4 - 97.8 fL    MCH 32.0 27.0 - 33.0 pg    MCHC 34.1 32.2 - 35.5 g/dL    RDW 44.1 35.9 - 50.0 fL    Platelet Count 262 164 - 446 K/uL    MPV 10.0 9.0 - 12.9 fL   CBC WITHOUT DIFFERENTIAL    Collection Time: 12/18/24  5:08 PM   Result Value Ref Range    WBC 23.6 (H) 4.8 - 10.8 K/uL    RBC 4.00 (L) 4.20 - 5.40 M/uL    Hemoglobin 12.6 12.0 - 16.0 g/dL    Hematocrit 36.9 (L) 37.0 - 47.0 %    MCV 92.3 81.4 - 97.8 fL    MCH 31.5 27.0 - 33.0 pg    MCHC 34.1 32.2 - 35.5 g/dL    RDW 52.0 (H) 35.9 - 50.0 fL    Platelet Count 205 164 - 446 K/uL    MPV 10.0 9.0 - 12.9 fL   CBC without differential- Once in AM regardless of delivery time     Collection Time: 12/19/24  1:52 AM   Result Value Ref Range    WBC 16.4 (H) 4.8 - 10.8 K/uL    RBC 3.35 (L) 4.20 - 5.40 M/uL    Hemoglobin 10.4 (L) 12.0 - 16.0 g/dL    Hematocrit 30.4 (L) 37.0 - 47.0 %    MCV 90.7 81.4 - 97.8 fL    MCH 31.0 27.0 - 33.0 pg    MCHC 34.2 32.2 - 35.5 g/dL    RDW 52.2 (H) 35.9 - 50.0 fL    Platelet Count 175 164 - 446 K/uL    MPV 10.0 9.0 - 12.9 fL   Comp Metabolic Panel    Collection Time: 12/19/24  1:52 AM   Result Value Ref Range    Sodium 127 (L) 135 - 145 mmol/L    Potassium 5.1 3.6 - 5.5 mmol/L    Chloride 98 96 - 112 mmol/L    Co2 19 (L) 20 - 33 mmol/L    Anion Gap 10.0 7.0 - 16.0    Glucose 85 65 - 99 mg/dL    Bun 15 8 - 22 mg/dL    Creatinine 0.78 0.50 - 1.40 mg/dL    Calcium 6.9 (LL) 8.5 - 10.5 mg/dL    Correct Calcium 8.2 (L) 8.5 - 10.5 mg/dL    AST(SGOT) 25 12 - 45 U/L    ALT(SGPT) 15 2 - 50 U/L    Alkaline Phosphatase 99 30 - 99 U/L    Total Bilirubin 0.2 0.1 - 1.5 mg/dL    Albumin 2.4 (L) 3.2 - 4.9 g/dL    Total Protein 4.6 (L) 6.0 - 8.2 g/dL    Globulin 2.2 1.9 - 3.5 g/dL    A-G Ratio 1.1 g/dL   MAGNESIUM    Collection Time: 12/19/24  1:52 AM   Result Value Ref Range    Magnesium 6.7 (H) 1.5 - 2.5 mg/dL   ESTIMATED GFR    Collection Time: 12/19/24  1:52 AM   Result Value Ref Range    GFR (CKD-EPI) 101 >60 mL/min/1.73 m 2   CBC WITHOUT DIFFERENTIAL    Collection Time: 12/19/24  7:07 AM   Result Value Ref Range    WBC 16.1 (H) 4.8 - 10.8 K/uL    RBC 3.80 (L) 4.20 - 5.40 M/uL    Hemoglobin 11.8 (L) 12.0 - 16.0 g/dL    Hematocrit 34.1 (L) 37.0 - 47.0 %    MCV 89.7 81.4 - 97.8 fL    MCH 31.1 27.0 - 33.0 pg    MCHC 34.6 32.2 - 35.5 g/dL    RDW 51.6 (H) 35.9 - 50.0 fL    Platelet Count 186 164 - 446 K/uL    MPV 10.0 9.0 - 12.9 fL   Comp Metabolic Panel    Collection Time: 12/19/24  7:07 AM   Result Value Ref Range    Sodium 127 (L) 135 - 145 mmol/L    Potassium 4.8 3.6 - 5.5 mmol/L    Chloride 98 96 - 112 mmol/L    Co2 20 20 - 33 mmol/L    Anion Gap 9.0 7.0 - 16.0    Glucose 79  65 - 99 mg/dL    Bun 14 8 - 22 mg/dL    Creatinine 0.84 0.50 - 1.40 mg/dL    Calcium 7.3 (L) 8.5 - 10.5 mg/dL    Correct Calcium 8.4 (L) 8.5 - 10.5 mg/dL    AST(SGOT) 32 12 - 45 U/L    ALT(SGPT) 15 2 - 50 U/L    Alkaline Phosphatase 111 (H) 30 - 99 U/L    Total Bilirubin 0.2 0.1 - 1.5 mg/dL    Albumin 2.6 (L) 3.2 - 4.9 g/dL    Total Protein 5.3 (L) 6.0 - 8.2 g/dL    Globulin 2.7 1.9 - 3.5 g/dL    A-G Ratio 1.0 g/dL   MAGNESIUM    Collection Time: 24  7:07 AM   Result Value Ref Range    Magnesium 7.1 (H) 1.5 - 2.5 mg/dL   ESTIMATED GFR    Collection Time: 24  7:07 AM   Result Value Ref Range    GFR (CKD-EPI) 93 >60 mL/min/1.73 m 2   SERUM MAGNESIUM LEVELS EVERY 6 HRS UNTIL DESIRED RANGE (5-8 MG/DL) ACHIEVED    Collection Time: 24 12:09 AM   Result Value Ref Range    Magnesium 3.2 (H) 1.5 - 2.5 mg/dL   Comp Metabolic Panel    Collection Time: 24 12:09 AM   Result Value Ref Range    Sodium 131 (L) 135 - 145 mmol/L    Potassium 4.2 3.6 - 5.5 mmol/L    Chloride 99 96 - 112 mmol/L    Co2 21 20 - 33 mmol/L    Anion Gap 11.0 7.0 - 16.0    Glucose 97 65 - 99 mg/dL    Bun 13 8 - 22 mg/dL    Creatinine 0.70 0.50 - 1.40 mg/dL    Calcium 7.5 (L) 8.5 - 10.5 mg/dL    Correct Calcium 8.5 8.5 - 10.5 mg/dL    AST(SGOT) 21 12 - 45 U/L    ALT(SGPT) 13 2 - 50 U/L    Alkaline Phosphatase 110 (H) 30 - 99 U/L    Total Bilirubin <0.2 0.1 - 1.5 mg/dL    Albumin 2.7 (L) 3.2 - 4.9 g/dL    Total Protein 5.5 (L) 6.0 - 8.2 g/dL    Globulin 2.8 1.9 - 3.5 g/dL    A-G Ratio 1.0 g/dL   ESTIMATED GFR    Collection Time: 24 12:09 AM   Result Value Ref Range    GFR (CKD-EPI) 115 >60 mL/min/1.73 m 2         Intake/Output Summary (Last 24 hours) at 2024 0604  Last data filed at 2024 1057  Gross per 24 hour   Intake 525.83 ml   Output 1475 ml   Net -949.17 ml       Assessment and Plan:   Sasha Bryant is a 34 y.o. now  on post-op day #2 following  rLTCS c/b placenta acreta, EBL 2000ml.  S/p 2u  PRBCS    #postpartum  - Doing well postpartum, meeting all postpartum milestones  - encouraged ambulation, cont routine postop care  - continue prenatal vitamins  ?Pain control: Continue narcotic, Tylenol PRN.   -Simethicone for gas pain    #Severe preeclampsia -   Bps remain normal,  mag sulfate stopped at 24h post delivery. Repeat CMP improved hypocalcemia and hyponatremia  -continue monitoring of Bps.   -Continue procardia 30 mg XL daily.     #Acute blood loss anemia  Ss/p 2U pRBC after -2L loss. Hgb stable and improved on repeat testing  - iron 325mg and ascorbic acid 500mg PO together every other day for six months postpartum    # well being - baby in NICU  --infant feeding: pumping breast milk for baby in NICU.     # delivery  - removed dressing after 24 hours   - incision check in clinic in 1-2 weeks    # ppx:   - SCDs  - Early ambulation    Disposition: Anticipate discharge to home on POD  3     Naeem Flanagan MD  PGY 1  [facility]

## 2024-12-20 NOTE — LACTATION NOTE
This note was copied from a baby's chart.  Mom was sleeping when LC came to room.   LC will follow up.    1600 : Mom was finished pumping when LC arrived. Settings were reviewed and pumping frequency and rationale. Taught hand expression and discussed viewing the [employer] hand expression and recommended 2-3 minutes of hand expression in bottles after double pumping.   Mom has a pump at home for personal use.   Mom is aware of staff's role in the NICU while baby is being admitted.

## 2024-12-20 NOTE — PROGRESS NOTES
Assumed care of patient, report at bedside from, Jeff GOLDBERG. Assessment completed and WDL. Call light within reach, discussed plan of care, denies pain at the moment. Will continue to monitor.

## 2024-12-20 NOTE — CARE PLAN
The patient is Stable - Low risk of patient condition declining or worsening    Shift Goals  Clinical Goals: Maintain VSS, pain control  Patient Goals: Visit baby in NICU  Family Goals: Support    Progress made toward(s) clinical / shift goals:  Pain controlled with Scheduled medications, pt developed gas pain, ambulated and heat provided to apply into the shoulder.     Patient is not progressing towards the following goals:

## 2024-12-21 ENCOUNTER — PHARMACY VISIT (OUTPATIENT)
Dept: PHARMACY | Facility: MEDICAL CENTER | Age: 34
End: 2024-12-21
Payer: COMMERCIAL

## 2024-12-21 VITALS
OXYGEN SATURATION: 95 % | BODY MASS INDEX: 31.88 KG/M2 | DIASTOLIC BLOOD PRESSURE: 81 MMHG | HEART RATE: 84 BPM | TEMPERATURE: 97.4 F | WEIGHT: 179.9 LBS | HEIGHT: 63 IN | SYSTOLIC BLOOD PRESSURE: 121 MMHG | RESPIRATION RATE: 18 BRPM

## 2024-12-21 LAB
ALBUMIN SERPL BCP-MCNC: 2.8 G/DL (ref 3.2–4.9)
ALBUMIN/GLOB SERPL: 0.8 G/DL
ALP SERPL-CCNC: 135 U/L (ref 30–99)
ALT SERPL-CCNC: 17 U/L (ref 2–50)
ANION GAP SERPL CALC-SCNC: 9 MMOL/L (ref 7–16)
AST SERPL-CCNC: 30 U/L (ref 12–45)
BILIRUB SERPL-MCNC: <0.2 MG/DL (ref 0.1–1.5)
BUN SERPL-MCNC: 10 MG/DL (ref 8–22)
CALCIUM ALBUM COR SERPL-MCNC: 9.9 MG/DL (ref 8.5–10.5)
CALCIUM SERPL-MCNC: 8.9 MG/DL (ref 8.5–10.5)
CHLORIDE SERPL-SCNC: 103 MMOL/L (ref 96–112)
CO2 SERPL-SCNC: 23 MMOL/L (ref 20–33)
CREAT SERPL-MCNC: 0.73 MG/DL (ref 0.5–1.4)
GFR SERPLBLD CREATININE-BSD FMLA CKD-EPI: 110 ML/MIN/1.73 M 2
GLOBULIN SER CALC-MCNC: 3.5 G/DL (ref 1.9–3.5)
GLUCOSE SERPL-MCNC: 80 MG/DL (ref 65–99)
POTASSIUM SERPL-SCNC: 4.4 MMOL/L (ref 3.6–5.5)
PROT SERPL-MCNC: 6.3 G/DL (ref 6–8.2)
SODIUM SERPL-SCNC: 135 MMOL/L (ref 135–145)

## 2024-12-21 PROCEDURE — A9270 NON-COVERED ITEM OR SERVICE: HCPCS | Performed by: OBSTETRICS & GYNECOLOGY

## 2024-12-21 PROCEDURE — 80053 COMPREHEN METABOLIC PANEL: CPT

## 2024-12-21 PROCEDURE — RXMED WILLOW AMBULATORY MEDICATION CHARGE

## 2024-12-21 PROCEDURE — 36415 COLL VENOUS BLD VENIPUNCTURE: CPT

## 2024-12-21 PROCEDURE — 700102 HCHG RX REV CODE 250 W/ 637 OVERRIDE(OP): Performed by: OBSTETRICS & GYNECOLOGY

## 2024-12-21 PROCEDURE — 99238 HOSP IP/OBS DSCHRG MGMT 30/<: CPT | Performed by: OBSTETRICS & GYNECOLOGY

## 2024-12-21 RX ORDER — FERROUS SULFATE 325(65) MG
325 TABLET ORAL DAILY
Qty: 30 TABLET | Refills: 3 | Status: SHIPPED | OUTPATIENT
Start: 2024-12-21

## 2024-12-21 RX ORDER — NIFEDIPINE 30 MG/1
30 TABLET, EXTENDED RELEASE ORAL DAILY
Qty: 30 TABLET | Refills: 1 | Status: SHIPPED | OUTPATIENT
Start: 2024-12-22

## 2024-12-21 RX ORDER — ASCORBIC ACID 500 MG
500 TABLET ORAL DAILY
Qty: 30 TABLET | Refills: 3 | Status: SHIPPED | OUTPATIENT
Start: 2024-12-21

## 2024-12-21 RX ORDER — OXYCODONE HYDROCHLORIDE 5 MG/1
5 TABLET ORAL EVERY 4 HOURS PRN
Qty: 12 TABLET | Refills: 0 | Status: SHIPPED | OUTPATIENT
Start: 2024-12-21 | End: 2025-01-21

## 2024-12-21 RX ORDER — PSEUDOEPHEDRINE HCL 30 MG
100 TABLET ORAL 2 TIMES DAILY PRN
Qty: 60 CAPSULE | Refills: 0 | Status: SHIPPED | OUTPATIENT
Start: 2024-12-21

## 2024-12-21 RX ORDER — ACETAMINOPHEN 500 MG
1000 TABLET ORAL EVERY 6 HOURS PRN
Qty: 30 TABLET | Refills: 1 | Status: SHIPPED | OUTPATIENT
Start: 2024-12-21 | End: 2025-01-20

## 2024-12-21 RX ADMIN — ACETAMINOPHEN 1000 MG: 500 TABLET ORAL at 00:03

## 2024-12-21 RX ADMIN — ACETAMINOPHEN 1000 MG: 500 TABLET ORAL at 11:45

## 2024-12-21 RX ADMIN — ACETAMINOPHEN 1000 MG: 500 TABLET ORAL at 06:02

## 2024-12-21 RX ADMIN — NIFEDIPINE 30 MG: 30 TABLET, FILM COATED, EXTENDED RELEASE ORAL at 06:02

## 2024-12-21 RX ADMIN — OXYCODONE 5 MG: 5 TABLET ORAL at 07:34

## 2024-12-21 ASSESSMENT — SOCIAL DETERMINANTS OF HEALTH (SDOH)

## 2024-12-21 ASSESSMENT — PAIN DESCRIPTION - PAIN TYPE
TYPE: ACUTE PAIN;SURGICAL PAIN
TYPE: SURGICAL PAIN
TYPE: ACUTE PAIN;SURGICAL PAIN
TYPE: ACUTE PAIN
TYPE: ACUTE PAIN;SURGICAL PAIN

## 2024-12-21 NOTE — LACTATION NOTE
This note was copied from a baby's chart.  Lactation follow-up visit    Baby 34.5 weeks LPI baby in NICU. MOB has been pumping regularly every 2-3 hours, now collecting 25 ml's per pump session & taking to NICU. Mother has a Spectra at home & plans to use her personal pump once discharged. Discussed renting HG pump (info sheet given) if Spectra is not creating increasing volume of EBM. Storage Guidelines given.

## 2024-12-21 NOTE — CARE PLAN
Problem: Pain - Standard  Goal: Alleviation of pain or a reduction in pain to the patient’s comfort goal  Reactivated     Problem: Altered Physiologic Condition  Goal: Patient physiologically stable as evidenced by normal lochia, palpable uterine involution and vitals within normal limits  Reactivated     Problem: Bowel Elimination - Post Surgical  Goal: Patient will resume regular bowel sounds and function with no discomfort or distention  Reactivated     The patient is Stable - Low risk of patient condition declining or worsening    Shift Goals  Clinical Goals: Pain control, lochia WDL  Patient Goals: Visit infant in nicu, pumping, pain control and rest  Family Goals: Rest, support, infant care in nicu    Progress made toward(s) clinical / shift goals:  Pain well controlled with with current prescribed medications as per MAR, pt is able to ambulate and care for self and visit infant in NICU without difficulty. Lochia remains scant without clots expressed, performing rgoer care and pad changes independently. No s/s infection noted on assessment, remains afebrile. Tolerating regular po diet, passing gas and has had a BM since delivery.    Patient is not progressing towards the following goals: NA

## 2024-12-21 NOTE — DISCHARGE SUMMARY
[facility]  Obstetrics Discharge Summary    Date of Admission: 2024  Date of Discharge: 24    Admitting diagnosis:    1. Pregnancy at 34w5d  2. Severe Pre-Eclampsia    Discharge Diagnosis:   1. Status post  for repeat.  2. Severe Pre-Eclampsia  3. Placenta Accreta  4. Acute Blood Loss Anemia  5. Uterine Atony    Hospital Course:   Pt is 34 y.o. now  who presented on 2024 for severe range blood pressures and signs of pre-eclampsia, admitted for observation and BP management, eventually deciding for repeat LTCS in s/o prior CS and severe preeclampsia  .   Epidural anesthesia was utilized with good effect on pain.    BP treated with Magnesium and Nifedipine, MFM consulted and recommended delivery. For continued     Postpartum course was remarkable for BP management, Mg Sulfate stopped 24h postpartum, hypocalcemia and hyponatremia that improved and resolved. Infant to NICU.  Patient has met all postpartum milestones.  Patient had early ambulation, well managed pain, tolerance of diet, spontaneous voiding, and appropriate feeding of infant with pumped milk.   She has remained afebrile and blood pressure has been controlled on Nifedipine after deliery  All maternal questions and concerns addressed.    Single female infant was delivered via rLTCS on 24 at 1028 with APGARs 8 and 9 at 1 and 5 minutes respectively.   EBL 2000ml ml    PHYSICAL EXAM:  Temp:  [36.5 °C (97.7 °F)-37.3 °C (99.1 °F)] 36.6 °C (97.9 °F)  Pulse:  [79-96] 79  Resp:  [17-18] 18  BP: (128-138)/(82-89) 128/89  SpO2:  [93 %-95 %] 95 %    GEN: well appearing, no apparent distress  CV: +S1S2, RRR, no BLE edema  RESP: CTAB, breathing comfortably on RA  ABD: soft, mild soreness of lower abdominal wall, no significant tenderness, non-distended, +BS  Fundus: firm below level of umbilicus  Incision: dressing clean, dry, intact  Perineum: Deferred  Extremities: symmetric, calves nontender    HISTORY:  Patient  Active Problem List   Diagnosis    Indication for care in labor or delivery    Gestational hypertension, third trimester    Fetal growth restriction antepartum    History of postpartum hemorrhage    History of  delivery    History of sepsis    History of loop electrical excision procedure (LEEP)    Labor and delivery complications, antepartum    Hemorrhage      History reviewed. No pertinent past medical history.  OB History    Para Term  AB Living   2 2 1 1   2   SAB IAB Ectopic Molar Multiple Live Births           0 1      # Outcome Date GA Lbr Joss/2nd Weight Sex Type Anes PTL Lv   2  24 34w5d   F CS-LTranv Spinal Y TAWANA   1 Term     M CS-LTranv  N       Complications: Intraamniotic Infection, Failure to Progress in Second Stage     Past Surgical History:   Procedure Laterality Date    REPEAT C SECTION N/A 2024    Procedure:  SECTION, REPEAT;  Surgeon: Luis Angel Schwartz M.D.;  Location: [facility] DELIV[facility];  Service: Labor and Delivery     Allergies   Allergen Reactions    Copper      Increased vaginal mucous with copper IUD      Current Facility-Administered Medications   Medication Dose    simethicone (Mylicon) chewable tablet 125 mg  125 mg    NIFEdipine SR (Procadia-XL) tablet 30 mg  30 mg    lactated ringers infusion  2,000 mL    acetaminophen (Tylenol) tablet 1,000 mg  1,000 mg    Followed by    [START ON 2024] acetaminophen (Tylenol) tablet 1,000 mg  1,000 mg    oxyCODONE immediate-release (Roxicodone) tablet 5 mg  5 mg    oxyCODONE immediate release (Roxicodone) tablet 10 mg  10 mg    ondansetron (Zofran) syringe/vial injection 4 mg  4 mg    Or    ondansetron (Zofran ODT) dispertab 4 mg  4 mg    diphenhydrAMINE (Benadryl) tablet/capsule 25 mg  25 mg    Or    diphenhydrAMINE (Benadryl) injection 25 mg  25 mg    docusate sodium (Colace) capsule 100 mg  100 mg    labetalol (Normodyne/Trandate) injection 10 mg  10 mg    acetaminophen (Tylenol) tablet 650  mg  650 mg    prenatal plus vitamin (Stuartnatal 1+1) 27-1 MG tablet 1 Tablet  1 Tablet     Recent Labs     24  1708 24  0152 24  0707   WBC 23.6* 16.4* 16.1*   RBC 4.00* 3.35* 3.80*   HEMOGLOBIN 12.6 10.4* 11.8*   HEMATOCRIT 36.9* 30.4* 34.1*   MCV 92.3 90.7 89.7   MCH 31.5 31.0 31.1   MCHC 34.1 34.2 34.6   RDW 52.0* 52.2* 51.6*   PLATELETCT 205 175 186   MPV 10.0 10.0 10.0       Discharge Meds:   No current outpatient medications on file.     #Repeat LTCS:  -routine post partum follow up  -Pain: Continue Tylenol and Oxycodone prn    #Severe preeclampsia -   Bps remain normal,  mag sulfate stopped at 24h post delivery.   -continue monitoring of Bps.   -Continue procardia 30 mg XL daily.    #Acute blood loss anemia  Ss/p 2U pRBC after -2L loss. Hgb stable and improved on repeat testing  - iron 325mg and ascorbic acid 500mg PO together every other day for six months postpartum     # well being - baby in NICU  --infant feeding: pumping breast milk for baby in NICU.    #Contraception  - plans for Mirena IUD with [facility]      Activity/ Discharge Instructions:  Discharge to home  Exercise and Activities as tolerated  Call or come to ED for: heavy vaginal bleeding, fever >100.4, severe abdominal pain, severe headache, chest pain, shortness of breath, significant nausea or vomiting, incisional drainage, or other concerns.    Diet:  As tolerated. Additional 400 kcal per day to maintain milk supply. Drink plenty of fluids daily.  Continue prenatal vitamins for six months or as long as breastfeeding.  Continue iron and vitamin C every other day for six months or until anemia improves.     Follow up:    [facility] early as possible next week Monday or Tuesday before the holiday for incision check for  delivery and BP check.  If cannot see [facility]. [facility] or go to [facility] for BP check.     Naeem Flanagan MD  PGY1  [facility]

## 2024-12-21 NOTE — PROGRESS NOTES
Pt assessed, fundus firm, lochia light. No s/s of distress. Bonding well w/ infant. Visits NICU frequently. Fob @ bs, pt up to BR and voiding w/o difficulty. Denies pain at this time, abd inc open cassy, cdi, pt passing gas. Still complaining of slight gas pain, advised ambulation.

## 2024-12-21 NOTE — PROGRESS NOTES
Bedside report received from dayshift RN. Pt awake and alert with SO at bedside for support. Denies pain or needs at this time, planning on going to NICU to visit infant and will notify nursing staff when returned to floor for assessment. 12hr chart check complete, MAR and orders reviewed.

## 2024-12-21 NOTE — PROGRESS NOTES
0800  Received report from ASHLYN Vanegas at change of shift. Patient assessed and POC discussed. Fundus firm, lochia light.  Patient denies any needs at this time. Call light within reach, bed in lowest position. Patient leaving unit to visit infant in NICU. Pt is encouraged to call for pain/med interventions and any other needs when she returns to unit.    1250   Discharge instructions reviewed. Verbalized understanding. Documents signed    1254  Left Postpartum unit. Escorted by staff

## 2024-12-21 NOTE — DISCHARGE INSTRUCTIONS

## 2024-12-21 NOTE — CARE PLAN
The patient is Stable - Low risk of patient condition declining or worsening    Shift Goals  Clinical Goals: Maintain VSS, pain control  Patient Goals: Visit baby in NICU  Family Goals: Support    Progress made toward(s) clinical / shift goals:  Patient up and voiding, passing some gas, pumping and providing for infant in NICU. FOB supportive and no signs of infection.    Patient is not progressing towards the following goals:

## 2024-12-30 ENCOUNTER — LACTATION ENCOUNTER (OUTPATIENT)
Dept: OTHER | Facility: MEDICAL CENTER | Age: 34
End: 2024-12-30

## 2024-12-30 NOTE — LACTATION NOTE
This note was copied from a baby's chart.  Lactation Consult:    Non Nutritive latch consult scheduled for baby Dahiana who is now CGA 36w+3d.  MOBSasha has been pumping every 3hrs with home Spetctra pump yielding 4-6oz of EBM.  Reports no difficulties with pumping.     Sasha CHAVIRA, pumped prior to bringing baby into cross cradle on left breast, zipped baby's onesie to increase skin to skin contact with mom. After adjusting hand positioning and providing pillow support, baby sleepy, and latched after Sasha wedged breast and hand expressed breastmilk onto to baby's upper lip.  Suck pattern initially non nutritive with occasionally breaks.  Suck pattern progressed to intermittent swallows until baby tired out around 10min of effort. Bedside RN Nicole gavaged 100% feeding.    Recommended to continue to attempt non nutritive breastfeeding sessions for now and reschedule with lactation in 2-3 days for another latch assessment. Encourage daily skin to skin.

## 2025-01-12 ENCOUNTER — LACTATION ENCOUNTER (OUTPATIENT)
Dept: OTHER | Facility: MEDICAL CENTER | Age: 35
End: 2025-01-12

## 2025-01-12 NOTE — LACTATION NOTE
"This note was copied from a baby's chart.  I visited Sasha during her feeding today with baby Dahiana. Baby latched eagerly and nursed on both breasts with audible swallowing and a couple of 'gasps'. Sasha reports feeling a little stressed due to her \"fast let-down\".     She usually feeds in a laid back position but wanted some suggestions for other measures to use to help aid fast flow as she anticipates taking baby home with a busy 16 month old. She has noticed Dahiana being very fussy at start of feeding.    We discussed strategies to reduce time spent pumping and allow her body to naturally adjust to making a little less milk. Currently she pumps about 6-7 ounces every 3 hours. She has a wearable pump which she hasn't used yet. She has been using her Spectra at home.     She would like to have pre- and post-feeding weights done, I scheduled her for tomorrow. I mentioned using a nipple shield if necessary to help with flow but Sasha was not really interested as she reports it made her son very gassy when used.  "

## 2025-01-13 ENCOUNTER — LACTATION ENCOUNTER (OUTPATIENT)
Dept: OTHER | Facility: MEDICAL CENTER | Age: 35
End: 2025-01-13

## 2025-01-13 NOTE — LACTATION NOTE
This note was copied from a baby's chart.  Lactation consult per MOB request.  Pre/post weights done.  Baby skin to skin for feeding. Deep latch independently obtained in partial  laid back position, baby  suckles with swallows with let downs. Between letdowns baby sleeps or does some non-nutritive suckling. MOB taught how to do breast compression to move milk to baby and encourage more nutritive suckling.   Transferred 33 ml over 10 minutes on right and 14 ml over 10 minutes on left.  Total transfer 47 ml.  MOB continues with skin to skin after feeding. Encouraged skin to skin as much as allowed. Encouraged followup with outpatient lactation after discharge.

## 2025-05-14 ENCOUNTER — APPOINTMENT (OUTPATIENT)
Dept: URBAN - NONMETROPOLITAN AREA CLINIC 1 | Facility: CLINIC | Age: 35
Setting detail: DERMATOLOGY
End: 2025-05-14

## 2025-05-14 DIAGNOSIS — D485 NEOPLASM OF UNCERTAIN BEHAVIOR OF SKIN: ICD-10-CM

## 2025-05-14 DIAGNOSIS — D22 MELANOCYTIC NEVI: ICD-10-CM | Status: STABLE

## 2025-05-14 DIAGNOSIS — Z12.83 ENCOUNTER FOR SCREENING FOR MALIGNANT NEOPLASM OF SKIN: ICD-10-CM

## 2025-05-14 PROBLEM — D22.5 MELANOCYTIC NEVI OF TRUNK: Status: ACTIVE | Noted: 2025-05-14

## 2025-05-14 PROBLEM — D48.5 NEOPLASM OF UNCERTAIN BEHAVIOR OF SKIN: Status: ACTIVE | Noted: 2025-05-14

## 2025-05-14 PROCEDURE — ? BIOPSY BY SHAVE METHOD

## 2025-05-14 PROCEDURE — 11102 TANGNTL BX SKIN SINGLE LES: CPT

## 2025-05-14 PROCEDURE — 99212 OFFICE O/P EST SF 10 MIN: CPT | Mod: 25

## 2025-05-14 PROCEDURE — ? COUNSELING

## 2025-05-14 PROCEDURE — ? OBSERVATION

## 2025-05-14 ASSESSMENT — LOCATION DETAILED DESCRIPTION DERM
LOCATION DETAILED: EPIGASTRIC SKIN
LOCATION DETAILED: RIGHT SUPERIOR MEDIAL UPPER BACK
LOCATION DETAILED: RIGHT INFERIOR LATERAL UPPER BACK
LOCATION DETAILED: PERIUMBILICAL SKIN

## 2025-05-14 ASSESSMENT — LOCATION SIMPLE DESCRIPTION DERM
LOCATION SIMPLE: ABDOMEN
LOCATION SIMPLE: RIGHT UPPER BACK

## 2025-05-14 ASSESSMENT — LOCATION ZONE DERM: LOCATION ZONE: TRUNK

## 2025-07-30 NOTE — Clinical Note
REFERRAL APPROVAL NOTICE         Sent on July 30, 2025                   Sasha Casianoidan  [address] [address]  [place] [address]                   Dear Ms. Bryant,    After a careful review of the medical information and benefit coverage, [facility] has processed your referral. See below for additional details.    If applicable, you must be actively enrolled with your insurance for coverage of the authorized service. If you have any questions regarding your coverage, please contact your insurance directly.    REFERRAL INFORMATION   Referral #:  [id number]  Referred-To Department    Referred-By Provider:  Maternal Fetal Medicine    Radha Atwood M.D.   [facility]      [address] [address]  [phone] [address], [address]  [facility] [id number] [phone]    Referral Start Date:  07/30/2025  Referral End Date:   07/30/2026             SCHEDULING  If you do not already have an appointment, please call [phone] to make an appointment.     MORE INFORMATION  If you do not already have a [employer] account, sign up at: [email or web address]  You can access your medical information, make appointments, see lab results, billing information, and more.  If you have questions regarding this referral, please contact  the [facility] Referrals department at:             [phone]. Monday - Friday 8:00AM - 5:00PM.     Sincerely,    [facility]

## 2025-08-25 ENCOUNTER — OFFICE VISIT (OUTPATIENT)
Dept: MATERNAL FETAL MEDICINE | Facility: MEDICAL CENTER | Age: 35
End: 2025-08-25
Payer: COMMERCIAL

## 2025-08-25 VITALS
SYSTOLIC BLOOD PRESSURE: 109 MMHG | BODY MASS INDEX: 32.38 KG/M2 | DIASTOLIC BLOOD PRESSURE: 67 MMHG | HEART RATE: 58 BPM | WEIGHT: 182.8 LBS

## 2025-08-25 DIAGNOSIS — N85.8 UTERINE SCAR FROM PREVIOUS SURGERY: ICD-10-CM

## 2025-08-25 DIAGNOSIS — Z87.59 HISTORY OF PREGNANCY COMPLICATION: ICD-10-CM

## 2025-08-25 DIAGNOSIS — Z87.59 PERSONAL HISTORY OF OTHER COMPLICATIONS OF PREGNANCY, CHILDBIRTH AND THE PUERPERIUM: ICD-10-CM

## 2025-08-25 DIAGNOSIS — Z31.69 ENCOUNTER FOR PRECONCEPTION CONSULTATION: Primary | ICD-10-CM

## 2025-08-25 ASSESSMENT — FIBROSIS 4 INDEX: FIB4 SCORE: 1.41

## (undated) DEVICE — KIT  I.V. START (100EA/CA)

## (undated) DEVICE — SET EXTENSION WITH 2 PORTS (48EA/CA) ***PART #2C8610 IS A SUBSTITUTE*****

## (undated) DEVICE — WRAP PROBE OXIMETER INFANT UNIVERSAL DESIGN TO FIT NEONATAL FOOT INFANT TOE OR PED FINGER (12EA/BX)

## (undated) DEVICE — WATER IRRIGATION STERILE 1000ML (12EA/CA)

## (undated) DEVICE — CHLORAPREP 26 ML APPLICATOR - ORANGE TINT(25/CA)

## (undated) DEVICE — SODIUM CHL IRRIGATION 0.9% 1000ML (12EA/CA)

## (undated) DEVICE — TELFA 8 X 10 BIOSEAL - (50EA/CA)

## (undated) DEVICE — TRAY SPINAL ANESTHESIA NON-SAFETY (10/CA)

## (undated) DEVICE — CANNULA O2 COMFORT SOFT EAR ADULT 7 FT TUBING (50/CA)

## (undated) DEVICE — PACK ROOM TURNOVER L&D (12/CA)

## (undated) DEVICE — SLEEVE VASO DVT COMPRESSION CALF MED - (10PR/CA)

## (undated) DEVICE — SUTURE 4-0 VICRYL PLUS SH - UNDYED 27 INCH (36PK/BX)

## (undated) DEVICE — CHLORAPREP 3 ML APPLICATOR - (25/BX 4BX/CS 100/CS)

## (undated) DEVICE — VACURETTE 12MM CURVED 10/PKG

## (undated) DEVICE — SUTURE 2-0 VICRYL PLUS CT-1 36 (36PK/BX)"

## (undated) DEVICE — PAD GROUNDING PRE-JELLED - (50EA/PK)

## (undated) DEVICE — DRESSING ABDOMINAL PAD STERILE 8 X 10" (360EA/CA)"

## (undated) DEVICE — SUTUREABS02-0 CT1 27IN - (36EA/BX)

## (undated) DEVICE — SET FLUID WARMING HI FLOW (10EA/CA)

## (undated) DEVICE — BAG SPONGE COUNT 10.25 X 32 - BLUE (250/CA)

## (undated) DEVICE — ELECTRODE RADIOLUCENT LF 3PK - RED DOT (3/PK 200PK/CA)

## (undated) DEVICE — TRAY FOLEY CATHETER STATLOCK 16FR SURESTEP (10EA/CA)

## (undated) DEVICE — SENSOR SPO2 ADULT LNCS ADTX (20/BX) ORDER ITEM #19593

## (undated) DEVICE — CANISTER SUCTION 3000ML MECHANICAL FILTER AUTO SHUTOFF MEDI-VAC NONSTERILE LF DISP (40EA/CA)

## (undated) DEVICE — BLANKET UNDERBODY FULL ACCES - (5/CA)

## (undated) DEVICE — TUBE CONNECT SUCTION CLEAR 120 X 1/4" (50EA/CA)"

## (undated) DEVICE — GLOVE BIOGEL SZ 6.5 SURGICAL PF LTX (50PR/BX 4BX/CA)

## (undated) DEVICE — PLUMEPEN ULTRA 3/8 IN X 10 FT HOSE (20EA/CA)

## (undated) DEVICE — SOLUTION PLASMA-LYTE PH 7.4 INJ 1000ML (14EA/CA)

## (undated) DEVICE — HEAD HOLDER JUNIOR/ADULT

## (undated) DEVICE — BLANKET STERILE CHICKIE FOR L&D (100/CA)

## (undated) DEVICE — VACURETTE 7MM CURVED 10/PKG

## (undated) DEVICE — GLOVE BIOGEL INDICATOR SZ 6.5 SURGICAL PF LTX - (50PR/BX 4BX/CA)

## (undated) DEVICE — SUTURE 1 CHROMIC CT-1 (36PK/BX)

## (undated) DEVICE — SUCTION INSTRUMENT YANKAUER BULBOUS TIP W/O VENT (50EA/CA)

## (undated) DEVICE — RETRACTOR O C SECTION X-LRY - (5/BX)

## (undated) DEVICE — PAD LAP STERILE 18 X 18 - (5/PK 40PK/CA)

## (undated) DEVICE — SENSOR SPO2 NEO LNCS ADHESIVE (20/BX) SEE USER NOTES

## (undated) DEVICE — SPONGE GAUZESTER 4 X 4 4PLY - (128PK/CA)

## (undated) DEVICE — RETRACTOR O C SECTION LRY - (5/BX)

## (undated) DEVICE — PACK C-SECTION (2EA/CA)

## (undated) DEVICE — ADHESIVE DERMABOND HVD MINI (12EA/BX)

## (undated) DEVICE — CATHETER IV NON-SAFETY 18 GA X 1 1/4 (50/BX 4BX/CA)